# Patient Record
Sex: FEMALE | Race: BLACK OR AFRICAN AMERICAN | NOT HISPANIC OR LATINO | URBAN - METROPOLITAN AREA
[De-identification: names, ages, dates, MRNs, and addresses within clinical notes are randomized per-mention and may not be internally consistent; named-entity substitution may affect disease eponyms.]

---

## 2017-11-29 ENCOUNTER — EMERGENCY (EMERGENCY)
Facility: HOSPITAL | Age: 37
LOS: 0 days | Discharge: HOME | End: 2017-11-29
Attending: EMERGENCY MEDICINE
Payer: COMMERCIAL

## 2017-11-29 VITALS
TEMPERATURE: 98 F | RESPIRATION RATE: 16 BRPM | HEIGHT: 65 IN | DIASTOLIC BLOOD PRESSURE: 114 MMHG | SYSTOLIC BLOOD PRESSURE: 163 MMHG | OXYGEN SATURATION: 100 % | WEIGHT: 229.94 LBS | HEART RATE: 83 BPM

## 2017-11-29 DIAGNOSIS — E11.9 TYPE 2 DIABETES MELLITUS WITHOUT COMPLICATIONS: ICD-10-CM
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
## 2017-11-29 DIAGNOSIS — I10 ESSENTIAL (PRIMARY) HYPERTENSION: ICD-10-CM

## 2017-11-29 DIAGNOSIS — S69.91XA UNSPECIFIED INJURY OF RIGHT WRIST, HAND AND FINGER(S), INITIAL ENCOUNTER: ICD-10-CM
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
## 2017-11-29 DIAGNOSIS — M25.531 PAIN IN RIGHT WRIST: ICD-10-CM

## 2017-11-29 PROCEDURE — 99283 EMERGENCY DEPT VISIT LOW MDM: CPT

## 2017-11-29 PROCEDURE — 73110 X-RAY EXAM OF WRIST: CPT | Mod: 26,RT

## 2017-11-29 PROCEDURE — 73130 X-RAY EXAM OF HAND: CPT | Mod: 26,RT

## 2017-11-29 RX ORDER — ACETAMINOPHEN 500 MG
650 TABLET ORAL ONCE
Qty: 0 | Refills: 0 | Status: COMPLETED | OUTPATIENT
Start: 2017-11-29 | End: 2017-11-29

## 2017-11-29 RX ADMIN — Medication 650 MILLIGRAM(S): at 12:22

## 2017-11-29 NOTE — ED PROVIDER NOTE - MUSCULOSKELETAL, MLM
mild swelling right distal wrist with some point tenderness radial side. full rom but with pain. no scaphoid/snuff box tenderness/axial load pain.

## 2017-11-29 NOTE — ED PROVIDER NOTE - OBJECTIVE STATEMENT
36 y/o female hx htn, dm c/o pain to right wrist s/p mechanical fall just PTA. Denies hitting anything else, just fell on hand/wrist and with pain to distal wrist. no hitting head/loc/anticoagulation. Denies cp, sob, abd pain, other extremity pain. No other issues.     ROS: No fever/chills. No eye pain/changes in vision, No ear pain/sore throat/dysphagia, No chest pain/palpitations. No SOB/cough/. No abdominal pain, N/V/D, no black/bloody bm. No dysuria/frequency/discharge, No headache. No Dizziness.    No rashes or breaks in skin. No numbness/tingling/weakness.

## 2017-11-29 NOTE — ED ADULT NURSE NOTE - OBJECTIVE STATEMENT
38 yo female c/o s/p mechanical fall x 1000 today, R wrist pain, denies loc or head injury or trauma, pt ambulates without difficulty.

## 2019-03-03 ENCOUNTER — EMERGENCY (EMERGENCY)
Age: 39
LOS: 1 days | Discharge: HOME | End: 2019-03-03
Attending: EMERGENCY MEDICINE | Admitting: EMERGENCY MEDICINE
Payer: COMMERCIAL

## 2019-03-03 VITALS — OXYGEN SATURATION: 100 % | HEART RATE: 68 BPM | TEMPERATURE: 99 F | RESPIRATION RATE: 14 BRPM

## 2019-03-03 VITALS — SYSTOLIC BLOOD PRESSURE: 139 MMHG | DIASTOLIC BLOOD PRESSURE: 96 MMHG

## 2019-03-03 LAB
ALBUMIN SERPL ELPH-MCNC: 4 G/DL — SIGNIFICANT CHANGE UP (ref 3.3–5)
ALP SERPL-CCNC: 74 U/L — SIGNIFICANT CHANGE UP (ref 40–120)
ALT FLD-CCNC: 12 U/L — SIGNIFICANT CHANGE UP (ref 4–33)
ANION GAP SERPL CALC-SCNC: 11 MMO/L — SIGNIFICANT CHANGE UP (ref 7–14)
AST SERPL-CCNC: 15 U/L — SIGNIFICANT CHANGE UP (ref 4–32)
BASE EXCESS BLDV CALC-SCNC: 2.4 MMOL/L — SIGNIFICANT CHANGE UP
BASOPHILS # BLD AUTO: 0.04 K/UL — SIGNIFICANT CHANGE UP (ref 0–0.2)
BASOPHILS NFR BLD AUTO: 0.4 % — SIGNIFICANT CHANGE UP (ref 0–2)
BILIRUB SERPL-MCNC: 0.2 MG/DL — SIGNIFICANT CHANGE UP (ref 0.2–1.2)
BLOOD GAS VENOUS - CREATININE: 0.65 MG/DL — SIGNIFICANT CHANGE UP (ref 0.5–1.3)
BUN SERPL-MCNC: 20 MG/DL — SIGNIFICANT CHANGE UP (ref 7–23)
CALCIUM SERPL-MCNC: 9 MG/DL — SIGNIFICANT CHANGE UP (ref 8.4–10.5)
CHLORIDE BLDV-SCNC: 105 MMOL/L — SIGNIFICANT CHANGE UP (ref 96–108)
CHLORIDE SERPL-SCNC: 100 MMOL/L — SIGNIFICANT CHANGE UP (ref 98–107)
CO2 SERPL-SCNC: 26 MMOL/L — SIGNIFICANT CHANGE UP (ref 22–31)
COHGB MFR BLDV: 4.6 % — HIGH (ref 0.5–1.5)
COHGB MFR BLDV: 9.9 G/DL — LOW (ref 11.5–15.5)
CREAT SERPL-MCNC: 0.75 MG/DL — SIGNIFICANT CHANGE UP (ref 0.5–1.3)
EOSINOPHIL # BLD AUTO: 0.26 K/UL — SIGNIFICANT CHANGE UP (ref 0–0.5)
EOSINOPHIL NFR BLD AUTO: 2.7 % — SIGNIFICANT CHANGE UP (ref 0–6)
GAS PNL BLDV: 134 MMOL/L — LOW (ref 136–146)
GLUCOSE BLDV-MCNC: 133 — HIGH (ref 70–99)
GLUCOSE SERPL-MCNC: 139 MG/DL — HIGH (ref 70–99)
HCG SERPL-ACNC: < 5 MIU/ML — SIGNIFICANT CHANGE UP
HCO3 BLDV-SCNC: 26 MMOL/L — SIGNIFICANT CHANGE UP (ref 20–27)
HCT VFR BLD CALC: 32.9 % — LOW (ref 34.5–45)
HCT VFR BLDV CALC: 30.6 % — LOW (ref 34.5–45)
HGB BLD-MCNC: 9.7 G/DL — LOW (ref 11.5–15.5)
HGB BLDV-MCNC: 9.9 G/DL — LOW (ref 11.5–15.5)
IMM GRANULOCYTES NFR BLD AUTO: 0.4 % — SIGNIFICANT CHANGE UP (ref 0–1.5)
LACTATE BLDV-MCNC: 1.3 MMOL/L — SIGNIFICANT CHANGE UP (ref 0.5–2)
LYMPHOCYTES # BLD AUTO: 1.8 K/UL — SIGNIFICANT CHANGE UP (ref 1–3.3)
LYMPHOCYTES # BLD AUTO: 18.6 % — SIGNIFICANT CHANGE UP (ref 13–44)
MCHC RBC-ENTMCNC: 25.3 PG — LOW (ref 27–34)
MCHC RBC-ENTMCNC: 29.5 % — LOW (ref 32–36)
MCV RBC AUTO: 85.9 FL — SIGNIFICANT CHANGE UP (ref 80–100)
METHGB MFR BLDV: 1 % — SIGNIFICANT CHANGE UP (ref 0–1.5)
MONOCYTES # BLD AUTO: 0.53 K/UL — SIGNIFICANT CHANGE UP (ref 0–0.9)
MONOCYTES NFR BLD AUTO: 5.5 % — SIGNIFICANT CHANGE UP (ref 2–14)
NEUTROPHILS # BLD AUTO: 7.02 K/UL — SIGNIFICANT CHANGE UP (ref 1.8–7.4)
NEUTROPHILS NFR BLD AUTO: 72.4 % — SIGNIFICANT CHANGE UP (ref 43–77)
NRBC # FLD: 0 K/UL — LOW (ref 25–125)
OXYHGB MFR BLDV: 66.3 % — SIGNIFICANT CHANGE UP (ref 59–84)
PCO2 BLDV: 47 MMHG — SIGNIFICANT CHANGE UP (ref 41–51)
PH BLDV: 7.38 PH — SIGNIFICANT CHANGE UP (ref 7.32–7.43)
PLATELET # BLD AUTO: 300 K/UL — SIGNIFICANT CHANGE UP (ref 150–400)
PMV BLD: 11.7 FL — SIGNIFICANT CHANGE UP (ref 7–13)
PO2 BLDV: 39 MMHG — SIGNIFICANT CHANGE UP (ref 35–40)
POTASSIUM BLDV-SCNC: 3.2 MMOL/L — LOW (ref 3.4–4.5)
POTASSIUM SERPL-MCNC: 3.3 MMOL/L — LOW (ref 3.5–5.3)
POTASSIUM SERPL-SCNC: 3.3 MMOL/L — LOW (ref 3.5–5.3)
PROT SERPL-MCNC: 6.9 G/DL — SIGNIFICANT CHANGE UP (ref 6–8.3)
RBC # BLD: 3.83 M/UL — SIGNIFICANT CHANGE UP (ref 3.8–5.2)
RBC # FLD: 15.8 % — HIGH (ref 10.3–14.5)
SAO2 % BLDV: 70.2 % — SIGNIFICANT CHANGE UP (ref 60–85)
SODIUM SERPL-SCNC: 137 MMOL/L — SIGNIFICANT CHANGE UP (ref 135–145)
WBC # BLD: 9.69 K/UL — SIGNIFICANT CHANGE UP (ref 3.8–10.5)
WBC # FLD AUTO: 9.69 K/UL — SIGNIFICANT CHANGE UP (ref 3.8–10.5)

## 2019-03-03 PROCEDURE — 99284 EMERGENCY DEPT VISIT MOD MDM: CPT | Mod: 25

## 2019-03-03 PROCEDURE — 71045 X-RAY EXAM CHEST 1 VIEW: CPT | Mod: 26

## 2019-03-03 NOTE — ED PROVIDER NOTE - ENMT, MLM
Airway patent, Nasal mucosa clear. Mouth with normal mucosa. No soot or carbonaceous material to nose/throat

## 2019-03-03 NOTE — ED PEDIATRIC NURSE REASSESSMENT NOTE - NS ED NURSE REASSESS COMMENT FT2
Dried blood cleaned from pt lower extremities. Pt denies pain. No deep wounds noted. Small skin tear noted to right knee. Dried cut to right fourth digit. no active bleeding.

## 2019-03-03 NOTE — ED PROVIDER NOTE - ATTENDING CONTRIBUTION TO CARE
Brit Trammell MD - Attending Physician: I have personally seen and examined this patient with the resident/fellow.  I have fully participated in the care of this patient. I have reviewed all pertinent clinical information, including history, physical exam, plan and the Resident/Fellow’s note and agree except as noted. See MDM
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
## 2019-03-03 NOTE — ED PROVIDER NOTE - PROGRESS NOTE DETAILS
Carboxyhemoglobin < 5. S/p 100% NRB obs period without complaints. No SOB/CP, lungs clear. Will dc. F/u outpatient. Return precautions discussed
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
## 2019-07-06 NOTE — ED ADULT NURSE NOTE - CAS DISCH ACCOMP BY
As instructed please call your surgeon on Monday return if you worsening or other concerning symptoms please follow up for left adnexal/ovarian cyst which was suggested on CT scan as instructed as discussed Self

## 2021-04-01 ENCOUNTER — INPATIENT (INPATIENT)
Facility: HOSPITAL | Age: 41
LOS: 0 days | Discharge: HOME | DRG: 177 | End: 2021-04-01
Attending: HOSPITALIST | Admitting: HOSPITALIST
Payer: MEDICAID

## 2021-04-01 ENCOUNTER — TRANSCRIPTION ENCOUNTER (OUTPATIENT)
Age: 41
End: 2021-04-01

## 2021-04-01 ENCOUNTER — INPATIENT (INPATIENT)
Facility: HOSPITAL | Age: 41
LOS: 3 days | Discharge: HOME | DRG: 177 | End: 2021-04-05
Attending: FAMILY MEDICINE | Admitting: FAMILY MEDICINE
Payer: MEDICAID

## 2021-04-01 VITALS
RESPIRATION RATE: 20 BRPM | HEART RATE: 100 BPM | DIASTOLIC BLOOD PRESSURE: 89 MMHG | WEIGHT: 289.91 LBS | OXYGEN SATURATION: 93 % | HEIGHT: 65 IN | SYSTOLIC BLOOD PRESSURE: 136 MMHG | TEMPERATURE: 101 F

## 2021-04-01 VITALS
SYSTOLIC BLOOD PRESSURE: 102 MMHG | RESPIRATION RATE: 18 BRPM | TEMPERATURE: 98 F | HEART RATE: 86 BPM | DIASTOLIC BLOOD PRESSURE: 69 MMHG | OXYGEN SATURATION: 95 %

## 2021-04-01 VITALS
HEART RATE: 81 BPM | RESPIRATION RATE: 18 BRPM | SYSTOLIC BLOOD PRESSURE: 117 MMHG | DIASTOLIC BLOOD PRESSURE: 56 MMHG | TEMPERATURE: 98 F | OXYGEN SATURATION: 95 %

## 2021-04-01 DIAGNOSIS — E11.9 TYPE 2 DIABETES MELLITUS WITHOUT COMPLICATIONS: ICD-10-CM

## 2021-04-01 DIAGNOSIS — U07.1 COVID-19: ICD-10-CM

## 2021-04-01 DIAGNOSIS — Z98.891 HISTORY OF UTERINE SCAR FROM PREVIOUS SURGERY: Chronic | ICD-10-CM

## 2021-04-01 DIAGNOSIS — I10 ESSENTIAL (PRIMARY) HYPERTENSION: ICD-10-CM

## 2021-04-01 DIAGNOSIS — R74.01 ELEVATION OF LEVELS OF LIVER TRANSAMINASE LEVELS: ICD-10-CM

## 2021-04-01 DIAGNOSIS — Z29.9 ENCOUNTER FOR PROPHYLACTIC MEASURES, UNSPECIFIED: ICD-10-CM

## 2021-04-01 LAB
A1C WITH ESTIMATED AVERAGE GLUCOSE RESULT: 6.5 % — HIGH (ref 4–5.6)
ALBUMIN SERPL ELPH-MCNC: 2.6 G/DL — LOW (ref 3.3–5)
ALBUMIN SERPL ELPH-MCNC: 3.7 G/DL — SIGNIFICANT CHANGE UP (ref 3.3–5)
ALP SERPL-CCNC: 78 U/L — SIGNIFICANT CHANGE UP (ref 40–120)
ALP SERPL-CCNC: 83 U/L — SIGNIFICANT CHANGE UP (ref 40–120)
ALT FLD-CCNC: 73 U/L — HIGH (ref 10–45)
ALT FLD-CCNC: 98 U/L — HIGH (ref 12–78)
ANION GAP SERPL CALC-SCNC: 12 MMOL/L — SIGNIFICANT CHANGE UP (ref 5–17)
AST SERPL-CCNC: 85 U/L — HIGH (ref 15–37)
AST SERPL-CCNC: 94 U/L — HIGH (ref 10–40)
BASOPHILS # BLD AUTO: 0.01 K/UL — SIGNIFICANT CHANGE UP (ref 0–0.2)
BASOPHILS NFR BLD AUTO: 0.3 % — SIGNIFICANT CHANGE UP (ref 0–2)
BILIRUB DIRECT SERPL-MCNC: 0.1 MG/DL — SIGNIFICANT CHANGE UP (ref 0.05–0.2)
BILIRUB INDIRECT FLD-MCNC: 0.2 MG/DL — SIGNIFICANT CHANGE UP (ref 0.2–1)
BILIRUB SERPL-MCNC: 0.3 MG/DL — SIGNIFICANT CHANGE UP (ref 0.2–1.2)
BILIRUB SERPL-MCNC: 0.3 MG/DL — SIGNIFICANT CHANGE UP (ref 0.2–1.2)
BUN SERPL-MCNC: 10 MG/DL — SIGNIFICANT CHANGE UP (ref 7–23)
CALCIUM SERPL-MCNC: 8.6 MG/DL — SIGNIFICANT CHANGE UP (ref 8.4–10.5)
CHLORIDE SERPL-SCNC: 98 MMOL/L — SIGNIFICANT CHANGE UP (ref 96–108)
CO2 SERPL-SCNC: 23 MMOL/L — SIGNIFICANT CHANGE UP (ref 22–31)
CREAT SERPL-MCNC: 0.77 MG/DL — SIGNIFICANT CHANGE UP (ref 0.5–1.3)
CREAT SERPL-MCNC: 0.91 MG/DL — SIGNIFICANT CHANGE UP (ref 0.5–1.3)
CRP SERPL-MCNC: 81 MG/L — HIGH (ref 0–4)
D DIMER BLD IA.RAPID-MCNC: 499 NG/ML DDU — HIGH
EOSINOPHIL # BLD AUTO: 0.01 K/UL — SIGNIFICANT CHANGE UP (ref 0–0.5)
EOSINOPHIL NFR BLD AUTO: 0.3 % — SIGNIFICANT CHANGE UP (ref 0–6)
ESTIMATED AVERAGE GLUCOSE: 140 MG/DL — HIGH (ref 68–114)
FERRITIN SERPL-MCNC: 236 NG/ML — HIGH (ref 15–150)
GAS PNL BLDV: SIGNIFICANT CHANGE UP
GLUCOSE BLDC GLUCOMTR-MCNC: 136 MG/DL — HIGH (ref 70–99)
GLUCOSE BLDC GLUCOMTR-MCNC: 147 MG/DL — HIGH (ref 70–99)
GLUCOSE SERPL-MCNC: 105 MG/DL — HIGH (ref 70–99)
HCT VFR BLD CALC: 33.4 % — LOW (ref 34.5–45)
HGB BLD-MCNC: 10.1 G/DL — LOW (ref 11.5–15.5)
IMM GRANULOCYTES NFR BLD AUTO: 0.5 % — SIGNIFICANT CHANGE UP (ref 0–1.5)
INR BLD: 1.13 RATIO — SIGNIFICANT CHANGE UP (ref 0.88–1.16)
LYMPHOCYTES # BLD AUTO: 0.9 K/UL — LOW (ref 1–3.3)
LYMPHOCYTES # BLD AUTO: 22.8 % — SIGNIFICANT CHANGE UP (ref 13–44)
MAGNESIUM SERPL-MCNC: 2.1 MG/DL — SIGNIFICANT CHANGE UP (ref 1.6–2.6)
MCHC RBC-ENTMCNC: 24.5 PG — LOW (ref 27–34)
MCHC RBC-ENTMCNC: 30.2 GM/DL — LOW (ref 32–36)
MCV RBC AUTO: 80.9 FL — SIGNIFICANT CHANGE UP (ref 80–100)
MONOCYTES # BLD AUTO: 0.13 K/UL — SIGNIFICANT CHANGE UP (ref 0–0.9)
MONOCYTES NFR BLD AUTO: 3.3 % — SIGNIFICANT CHANGE UP (ref 2–14)
NEUTROPHILS # BLD AUTO: 2.87 K/UL — SIGNIFICANT CHANGE UP (ref 1.8–7.4)
NEUTROPHILS NFR BLD AUTO: 72.8 % — SIGNIFICANT CHANGE UP (ref 43–77)
NRBC # BLD: 0 /100 WBCS — SIGNIFICANT CHANGE UP (ref 0–0)
PHOSPHATE SERPL-MCNC: 3.1 MG/DL — SIGNIFICANT CHANGE UP (ref 2.5–4.5)
PLATELET # BLD AUTO: 259 K/UL — SIGNIFICANT CHANGE UP (ref 150–400)
POTASSIUM SERPL-MCNC: 3.7 MMOL/L — SIGNIFICANT CHANGE UP (ref 3.5–5.3)
POTASSIUM SERPL-SCNC: 3.7 MMOL/L — SIGNIFICANT CHANGE UP (ref 3.5–5.3)
PROCALCITONIN SERPL-MCNC: 0.09 NG/ML — SIGNIFICANT CHANGE UP (ref 0.02–0.1)
PROT SERPL-MCNC: 7.1 G/DL — SIGNIFICANT CHANGE UP (ref 6–8.3)
PROT SERPL-MCNC: 7.2 G/DL — SIGNIFICANT CHANGE UP (ref 6–8.3)
PROTHROM AB SERPL-ACNC: 13.1 SEC — SIGNIFICANT CHANGE UP (ref 10.6–13.6)
RAPID RVP RESULT: DETECTED
RBC # BLD: 4.13 M/UL — SIGNIFICANT CHANGE UP (ref 3.8–5.2)
RBC # FLD: 16 % — HIGH (ref 10.3–14.5)
SARS-COV-2 RNA SPEC QL NAA+PROBE: DETECTED
SODIUM SERPL-SCNC: 133 MMOL/L — LOW (ref 135–145)
WBC # BLD: 3.94 K/UL — SIGNIFICANT CHANGE UP (ref 3.8–10.5)
WBC # FLD AUTO: 3.94 K/UL — SIGNIFICANT CHANGE UP (ref 3.8–10.5)

## 2021-04-01 PROCEDURE — 86140 C-REACTIVE PROTEIN: CPT

## 2021-04-01 PROCEDURE — 84295 ASSAY OF SERUM SODIUM: CPT

## 2021-04-01 PROCEDURE — 82962 GLUCOSE BLOOD TEST: CPT

## 2021-04-01 PROCEDURE — 82947 ASSAY GLUCOSE BLOOD QUANT: CPT

## 2021-04-01 PROCEDURE — 83605 ASSAY OF LACTIC ACID: CPT

## 2021-04-01 PROCEDURE — 80053 COMPREHEN METABOLIC PANEL: CPT

## 2021-04-01 PROCEDURE — 82803 BLOOD GASES ANY COMBINATION: CPT

## 2021-04-01 PROCEDURE — 84132 ASSAY OF SERUM POTASSIUM: CPT

## 2021-04-01 PROCEDURE — 99223 1ST HOSP IP/OBS HIGH 75: CPT

## 2021-04-01 PROCEDURE — 82728 ASSAY OF FERRITIN: CPT

## 2021-04-01 PROCEDURE — 84100 ASSAY OF PHOSPHORUS: CPT

## 2021-04-01 PROCEDURE — 71045 X-RAY EXAM CHEST 1 VIEW: CPT

## 2021-04-01 PROCEDURE — 83735 ASSAY OF MAGNESIUM: CPT

## 2021-04-01 PROCEDURE — 82435 ASSAY OF BLOOD CHLORIDE: CPT

## 2021-04-01 PROCEDURE — 83036 HEMOGLOBIN GLYCOSYLATED A1C: CPT

## 2021-04-01 PROCEDURE — 99285 EMERGENCY DEPT VISIT HI MDM: CPT

## 2021-04-01 PROCEDURE — 85379 FIBRIN DEGRADATION QUANT: CPT

## 2021-04-01 PROCEDURE — 84145 PROCALCITONIN (PCT): CPT

## 2021-04-01 PROCEDURE — 85025 COMPLETE CBC W/AUTO DIFF WBC: CPT

## 2021-04-01 PROCEDURE — 85014 HEMATOCRIT: CPT

## 2021-04-01 PROCEDURE — 85018 HEMOGLOBIN: CPT

## 2021-04-01 PROCEDURE — 99285 EMERGENCY DEPT VISIT HI MDM: CPT | Mod: 25

## 2021-04-01 PROCEDURE — 82330 ASSAY OF CALCIUM: CPT

## 2021-04-01 PROCEDURE — 0225U NFCT DS DNA&RNA 21 SARSCOV2: CPT

## 2021-04-01 PROCEDURE — 71045 X-RAY EXAM CHEST 1 VIEW: CPT | Mod: 26

## 2021-04-01 RX ORDER — DEXAMETHASONE 0.5 MG/5ML
6 ELIXIR ORAL DAILY
Refills: 0 | Status: DISCONTINUED | OUTPATIENT
Start: 2021-04-01 | End: 2021-04-01

## 2021-04-01 RX ORDER — ENOXAPARIN SODIUM 100 MG/ML
40 INJECTION SUBCUTANEOUS
Qty: 0 | Refills: 0 | DISCHARGE
Start: 2021-04-01

## 2021-04-01 RX ORDER — NIFEDIPINE 30 MG
30 TABLET, EXTENDED RELEASE 24 HR ORAL DAILY
Refills: 0 | Status: DISCONTINUED | OUTPATIENT
Start: 2021-04-01 | End: 2021-04-05

## 2021-04-01 RX ORDER — SODIUM CHLORIDE 9 MG/ML
1000 INJECTION INTRAMUSCULAR; INTRAVENOUS; SUBCUTANEOUS
Refills: 0 | Status: DISCONTINUED | OUTPATIENT
Start: 2021-04-01 | End: 2021-04-01

## 2021-04-01 RX ORDER — DEXTROSE 50 % IN WATER 50 %
15 SYRINGE (ML) INTRAVENOUS ONCE
Refills: 0 | Status: DISCONTINUED | OUTPATIENT
Start: 2021-04-01 | End: 2021-04-05

## 2021-04-01 RX ORDER — REMDESIVIR 5 MG/ML
100 INJECTION INTRAVENOUS EVERY 24 HOURS
Refills: 0 | Status: DISCONTINUED | OUTPATIENT
Start: 2021-04-02 | End: 2021-04-02

## 2021-04-01 RX ORDER — METFORMIN HYDROCHLORIDE 850 MG/1
1 TABLET ORAL
Qty: 0 | Refills: 0 | DISCHARGE

## 2021-04-01 RX ORDER — ENOXAPARIN SODIUM 100 MG/ML
40 INJECTION SUBCUTANEOUS DAILY
Refills: 0 | Status: DISCONTINUED | OUTPATIENT
Start: 2021-04-01 | End: 2021-04-05

## 2021-04-01 RX ORDER — DEXTROSE 50 % IN WATER 50 %
25 SYRINGE (ML) INTRAVENOUS ONCE
Refills: 0 | Status: DISCONTINUED | OUTPATIENT
Start: 2021-04-01 | End: 2021-04-01

## 2021-04-01 RX ORDER — DEXTROSE 50 % IN WATER 50 %
25 SYRINGE (ML) INTRAVENOUS ONCE
Refills: 0 | Status: DISCONTINUED | OUTPATIENT
Start: 2021-04-01 | End: 2021-04-05

## 2021-04-01 RX ORDER — SODIUM CHLORIDE 9 MG/ML
1000 INJECTION, SOLUTION INTRAVENOUS
Refills: 0 | Status: DISCONTINUED | OUTPATIENT
Start: 2021-04-01 | End: 2021-04-05

## 2021-04-01 RX ORDER — DEXTROSE 50 % IN WATER 50 %
12.5 SYRINGE (ML) INTRAVENOUS ONCE
Refills: 0 | Status: DISCONTINUED | OUTPATIENT
Start: 2021-04-01 | End: 2021-04-01

## 2021-04-01 RX ORDER — ACETAMINOPHEN 500 MG
650 TABLET ORAL EVERY 4 HOURS
Refills: 0 | Status: DISCONTINUED | OUTPATIENT
Start: 2021-04-01 | End: 2021-04-01

## 2021-04-01 RX ORDER — SODIUM CHLORIDE 9 MG/ML
1000 INJECTION, SOLUTION INTRAVENOUS
Refills: 0 | Status: DISCONTINUED | OUTPATIENT
Start: 2021-04-01 | End: 2021-04-01

## 2021-04-01 RX ORDER — LISINOPRIL 2.5 MG/1
20 TABLET ORAL DAILY
Refills: 0 | Status: DISCONTINUED | OUTPATIENT
Start: 2021-04-01 | End: 2021-04-05

## 2021-04-01 RX ORDER — DEXTROSE 50 % IN WATER 50 %
12.5 SYRINGE (ML) INTRAVENOUS ONCE
Refills: 0 | Status: DISCONTINUED | OUTPATIENT
Start: 2021-04-01 | End: 2021-04-05

## 2021-04-01 RX ORDER — INSULIN LISPRO 100/ML
VIAL (ML) SUBCUTANEOUS
Refills: 0 | Status: DISCONTINUED | OUTPATIENT
Start: 2021-04-01 | End: 2021-04-05

## 2021-04-01 RX ORDER — ACETAMINOPHEN 500 MG
650 TABLET ORAL EVERY 6 HOURS
Refills: 0 | Status: DISCONTINUED | OUTPATIENT
Start: 2021-04-01 | End: 2021-04-05

## 2021-04-01 RX ORDER — INFLUENZA VIRUS VACCINE 15; 15; 15; 15 UG/.5ML; UG/.5ML; UG/.5ML; UG/.5ML
0.5 SUSPENSION INTRAMUSCULAR ONCE
Refills: 0 | Status: DISCONTINUED | OUTPATIENT
Start: 2021-04-01 | End: 2021-04-05

## 2021-04-01 RX ORDER — DEXAMETHASONE 0.5 MG/5ML
1 ELIXIR ORAL
Qty: 0 | Refills: 0 | DISCHARGE
Start: 2021-04-01

## 2021-04-01 RX ORDER — REMDESIVIR 5 MG/ML
100 INJECTION INTRAVENOUS EVERY 24 HOURS
Refills: 0 | Status: DISCONTINUED | OUTPATIENT
Start: 2021-04-02 | End: 2021-04-01

## 2021-04-01 RX ORDER — INSULIN LISPRO 100/ML
VIAL (ML) SUBCUTANEOUS
Refills: 0 | Status: DISCONTINUED | OUTPATIENT
Start: 2021-04-01 | End: 2021-04-01

## 2021-04-01 RX ORDER — LISINOPRIL 2.5 MG/1
20 TABLET ORAL DAILY
Refills: 0 | Status: DISCONTINUED | OUTPATIENT
Start: 2021-04-01 | End: 2021-04-01

## 2021-04-01 RX ORDER — DEXAMETHASONE 0.5 MG/5ML
6 ELIXIR ORAL ONCE
Refills: 0 | Status: COMPLETED | OUTPATIENT
Start: 2021-04-01 | End: 2021-04-01

## 2021-04-01 RX ORDER — REMDESIVIR 5 MG/ML
100 INJECTION INTRAVENOUS
Qty: 0 | Refills: 0 | DISCHARGE
Start: 2021-04-01

## 2021-04-01 RX ORDER — DEXTROSE 50 % IN WATER 50 %
15 SYRINGE (ML) INTRAVENOUS ONCE
Refills: 0 | Status: DISCONTINUED | OUTPATIENT
Start: 2021-04-01 | End: 2021-04-01

## 2021-04-01 RX ORDER — GLUCAGON INJECTION, SOLUTION 0.5 MG/.1ML
1 INJECTION, SOLUTION SUBCUTANEOUS ONCE
Refills: 0 | Status: DISCONTINUED | OUTPATIENT
Start: 2021-04-01 | End: 2021-04-05

## 2021-04-01 RX ORDER — DEXAMETHASONE 0.5 MG/5ML
6 ELIXIR ORAL DAILY
Refills: 0 | Status: DISCONTINUED | OUTPATIENT
Start: 2021-04-02 | End: 2021-04-05

## 2021-04-01 RX ORDER — ACETAMINOPHEN 500 MG
2 TABLET ORAL
Qty: 0 | Refills: 0 | DISCHARGE
Start: 2021-04-01

## 2021-04-01 RX ORDER — REMDESIVIR 5 MG/ML
200 INJECTION INTRAVENOUS EVERY 24 HOURS
Refills: 0 | Status: COMPLETED | OUTPATIENT
Start: 2021-04-01 | End: 2021-04-01

## 2021-04-01 RX ORDER — INSULIN LISPRO 100/ML
VIAL (ML) SUBCUTANEOUS AT BEDTIME
Refills: 0 | Status: DISCONTINUED | OUTPATIENT
Start: 2021-04-01 | End: 2021-04-01

## 2021-04-01 RX ORDER — SODIUM CHLORIDE 9 MG/ML
1000 INJECTION INTRAMUSCULAR; INTRAVENOUS; SUBCUTANEOUS ONCE
Refills: 0 | Status: COMPLETED | OUTPATIENT
Start: 2021-04-01 | End: 2021-04-01

## 2021-04-01 RX ORDER — GLUCAGON INJECTION, SOLUTION 0.5 MG/.1ML
1 INJECTION, SOLUTION SUBCUTANEOUS ONCE
Refills: 0 | Status: DISCONTINUED | OUTPATIENT
Start: 2021-04-01 | End: 2021-04-01

## 2021-04-01 RX ORDER — FERROUS SULFATE 325(65) MG
325 TABLET ORAL DAILY
Refills: 0 | Status: DISCONTINUED | OUTPATIENT
Start: 2021-04-01 | End: 2021-04-05

## 2021-04-01 RX ORDER — DEXAMETHASONE 0.5 MG/5ML
6 ELIXIR ORAL DAILY
Refills: 0 | Status: DISCONTINUED | OUTPATIENT
Start: 2021-04-02 | End: 2021-04-01

## 2021-04-01 RX ORDER — NIFEDIPINE 30 MG
30 TABLET, EXTENDED RELEASE 24 HR ORAL DAILY
Refills: 0 | Status: DISCONTINUED | OUTPATIENT
Start: 2021-04-01 | End: 2021-04-01

## 2021-04-01 RX ORDER — ENOXAPARIN SODIUM 100 MG/ML
40 INJECTION SUBCUTANEOUS EVERY 12 HOURS
Refills: 0 | Status: DISCONTINUED | OUTPATIENT
Start: 2021-04-01 | End: 2021-04-01

## 2021-04-01 RX ORDER — FERROUS SULFATE 325(65) MG
325 TABLET ORAL DAILY
Refills: 0 | Status: DISCONTINUED | OUTPATIENT
Start: 2021-04-01 | End: 2021-04-01

## 2021-04-01 RX ORDER — ACETAMINOPHEN 500 MG
650 TABLET ORAL ONCE
Refills: 0 | Status: COMPLETED | OUTPATIENT
Start: 2021-04-01 | End: 2021-04-01

## 2021-04-01 RX ORDER — REMDESIVIR 5 MG/ML
INJECTION INTRAVENOUS
Refills: 0 | Status: DISCONTINUED | OUTPATIENT
Start: 2021-04-01 | End: 2021-04-01

## 2021-04-01 RX ADMIN — Medication 650 MILLIGRAM(S): at 20:07

## 2021-04-01 RX ADMIN — Medication 325 MILLIGRAM(S): at 12:44

## 2021-04-01 RX ADMIN — REMDESIVIR 200 MILLIGRAM(S): 5 INJECTION INTRAVENOUS at 10:32

## 2021-04-01 RX ADMIN — Medication 650 MILLIGRAM(S): at 21:06

## 2021-04-01 RX ADMIN — Medication 600 MILLIGRAM(S): at 21:23

## 2021-04-01 RX ADMIN — Medication 650 MILLIGRAM(S): at 12:44

## 2021-04-01 RX ADMIN — LISINOPRIL 20 MILLIGRAM(S): 2.5 TABLET ORAL at 10:32

## 2021-04-01 RX ADMIN — SODIUM CHLORIDE 1000 MILLILITER(S): 9 INJECTION INTRAMUSCULAR; INTRAVENOUS; SUBCUTANEOUS at 02:05

## 2021-04-01 RX ADMIN — Medication 650 MILLIGRAM(S): at 02:05

## 2021-04-01 RX ADMIN — Medication 6 MILLIGRAM(S): at 02:05

## 2021-04-01 RX ADMIN — Medication 325 MILLIGRAM(S): at 21:24

## 2021-04-01 RX ADMIN — ENOXAPARIN SODIUM 40 MILLIGRAM(S): 100 INJECTION SUBCUTANEOUS at 21:24

## 2021-04-01 RX ADMIN — Medication 650 MILLIGRAM(S): at 13:30

## 2021-04-01 NOTE — DISCHARGE NOTE PROVIDER - NSDCMRMEDTOKEN_GEN_ALL_CORE_FT
acetaminophen 325 mg oral tablet: 2 tab(s) orally every 4 hours, As needed, Temp greater or equal to 38.5C (101.3F)  dexamethasone 6 mg oral tablet: 1 tab(s) orally once a day  enoxaparin: 40 milligram(s) subcutaneous 2 times a day  ferrous sulfate 325 mg (65 mg elemental iron) oral tablet: 1 tab(s) orally once a day  lisinopril 20 mg oral tablet: 1 tab(s) orally once a day  Procardia XL 30 mg oral tablet, extended release: 1 tab(s) orally once a day  remdesivir 5 mg/mL intravenous solution: 100 milligram(s) intravenous once a day x 4 days

## 2021-04-01 NOTE — H&P ADULT - PROBLEM SELECTOR PLAN 3
monitor BP  pharmacy clarfied medications   patient on procardia and lisinopril - continued with hold parameters check a1c  hold home metformin  monitor FS  ISS for coverage for now   may need to add long acting insulin/premeal if FS elevated on decadron

## 2021-04-01 NOTE — ED ADULT TRIAGE NOTE - CHIEF COMPLAINT QUOTE
tested positive for COVID x 7 days; CXR today at [facility] revealed bilateral pneumonia, per pt; c/o sob

## 2021-04-01 NOTE — ED ADULT NURSE REASSESSMENT NOTE - NS ED NURSE REASSESS COMMENT FT1
Pt A+Ox3, VSS, resting comfortably in stretcher, call bell in reach. Pt aware of plan of care, admitted to medicine for COVID and diarrhea, pending admission bed.

## 2021-04-01 NOTE — DISCHARGE NOTE PROVIDER - NSDCCAREPROVSEEN_GEN_ALL_CORE_FT
CHIEF COMPLAINT:   Chief Complaint   Patient presents with   • Surgical Followup     left foot, still having some discomfort, still limping         HISTORY OF PRESENT ILLNESS:  Manuelito Boles presents to clinic today 4 week status post left foot removal of painful hardware and chilectomy. Patient relates minimal pain to the surgical foot. Has been ambulating with gym shoe. Relates pain to the achilles tendon. No other complaints.    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 EGD   • Fibromyalgia    • Foot deformity    • Genital warts    • Hernia, diaphragmatic    • Lactose intolerance    • Lumbar radiculopathy    • Neuropathy    • No blood products     PATIENT REQUESTS NO BLOOD PRODUCTS   • PTSD (post-traumatic stress disorder)    • RAD (reactive airway disease)    • RBBB    • Sjoegren syndrome (CMS/HCC)    • Unspecified hypothyroidism    • Vitamin B 12 deficiency          PAST SURGICAL HISTORY:   Past Surgical History:   Procedure Laterality Date   • Bunionectomy Left 07/10/2018   • Colonoscopy  03/07/2018    polyp/repeat 5 years, MAC ANESTHESIA   • Esophagogastroduodenoscopy  03/07/2018    reflux. MAC ANESTHESIA   • Eye surgery      RIGHT EYE, EYE MUSCLE   • Upper gastrointestinal endoscopy      WOKE DURING IV SEDATION       Medications:   Current Outpatient Medications   Medication Sig Dispense Refill   • oxyCODONE-acetaminophen (PERCOCET) 5-325 MG per tablet Take 1/2 tablet every six hours 60 tablet 0   • sertraline (ZOLOFT) 100 MG tablet Take 100 mg by mouth daily.     • gabapentin (NEURONTIN) 600 MG tablet TAKE 1/2  TABLET BY MOUTH EVERY MORNING AND 1 1/2 TABLET BY MOUTH EVERY EVENING 60 tablet 0   • tiZANidine (ZANAFLEX) 2 MG tablet TAKE 1/2 TO 1 TABLET BY MOUTH EVERY 8 HOURS AS NEEDED FOR PAIN 45 tablet 0   • ibuprofen (MOTRIN) 800 MG tablet Take 1 tablet by mouth every 8 hours as needed for Pain. 90 tablet 5   • Vitamin D, Ergocalciferol, 05610 units capsule Take 1 capsule by mouth 1 day a week. 12 capsule 0   • TRAZODONE HCL PO Take 50 mg by mouth nightly. (3) TABLETS AT HS     • levothyroxine (SYNTHROID, LEVOTHROID) 137 MCG tablet Take 1 tablet by mouth daily. 30 tablet 11   • DISPENSE Dispense One Wrap Around Lumbosacral Spinal Support brace.  Please size for patient. DX m54.41 1 each 0   • DISPENSE Dispense one TENS unit, leads, and pads for Fibromyalgia, Spinal, Muscular pain DX: m79.7, m54.6 1 Units 0   • omeprazole (PRILOSEC) 20 MG capsule Take 1 capsule by mouth 2 times daily (before meals). 180 capsule 3   • OLANZapine (ZYPREXA) 15 MG tablet Take 15 mg by mouth nightly.     • Cholecalciferol (VITAMIN D) 2000 units capsule Take 2,000 Units by mouth daily.     • Multiple Vitamins-Minerals (MULTIVITAMIN PO) Take 1 tablet by mouth daily.     • ZINC SULFATE PO Take 1 tablet by mouth daily.     • Ascorbic Acid (VITAMIN C PO) Take 1 tablet by mouth daily.     • MAGNESIUM PO Take 1 tablet by mouth daily.     • albuterol 108 (90 Base) MCG/ACT inhaler Inhale 2 puffs into the lungs every 4 hours as needed for Shortness of Breath or Wheezing. 1 Inhaler 0   • cetirizine (ZYRTEC) 10 MG tablet Take 1 tablet by mouth daily. 30 tablet 5   • montelukast (SINGULAIR) 10 MG tablet Take 1 tablet by mouth nightly. 30 tablet 5   • ondansetron (ZOFRAN ODT) 4 MG disintegrating tablet Take 1 tablet by mouth every 6 hours. 10 tablet 0   • albuterol (PROAIR HFA) 108 (90 BASE) MCG/ACT inhaler Inhale 2 puffs into the lungs every 4 hours as needed for Shortness of Breath or Wheezing. 1 Inhaler 0   • clonazePAM (KLONOPIN) 1 MG tablet PT TAKES 0.5  MG AM, 05 MG PM, 1 MG QHS AND TWO 0.5 MG PRN DAILY (Patient taking differently: 0.5 mg 3 times daily. ) 90 tablet 0   • Probiotic Product (PROBIOTIC DAILY PO) Take 1 capsule by mouth daily.      • diclofenac (VOLTAREN) 1 % gel Apply 4g to affected area QID prn pain 5 Tube 3     No current facility-administered medications for this visit.        Allergies:   ALLERGIES:  Cyclobenzaprine; Hydrocodone; Lactose intolerance (no food restrictions)    (food); Latex   (environmental); and Lamictal    Social History:   Social History     Socioeconomic History   • Marital status: /Civil Union     Spouse name: None   • Number of children: None   • Years of education: None   • Highest education level: None   Social Needs   • Financial resource strain: None   • Food insecurity - worry: None   • Food insecurity - inability: None   • Transportation needs - medical: None   • Transportation needs - non-medical: None   Occupational History   • Occupation: ssdi   Tobacco Use   • Smoking status: Current Every Day Smoker     Packs/day: 0.75     Types: Cigarettes   • Smokeless tobacco: Never Used   • Tobacco comment: has paperwork at home   Substance and Sexual Activity   • Alcohol use: No   • Drug use: No   • Sexual activity: None   Other Topics Concern   • None   Social History Narrative    Single and no children    Heavy menstrual cycles.    Has been Smoking 1 ppd and no ETOH       Family History:   Family History   Problem Relation Age of Onset   • Heart disease Mother    • Heart disease Father    • Depression Father    • Thyroid Sister         goiter   • Kidney disease Sister    • Irritable Bowel Syndrome Sister    • Irritable Bowel Syndrome Sister    • Irritable Bowel Syndrome Sister    • Colon Polyps Sister    • Cancer Sister         THYROID CANCER   • Irritable Bowel Syndrome Sister    • Thyroid Maternal Uncle    • Cancer, Colon Paternal Aunt    • Irritable Bowel Syndrome Paternal Uncle    • Irritable Bowel Syndrome  Paternal Uncle    • Irritable Bowel Syndrome Paternal Uncle        REVIEW OF SYSTEMS:   Patient appears alert and oriented  Patient denies burning, numbness, tingling and claudication pain bilateral  Patients denies pain with normal activities  Activity level: Remains physically active.      Physical Exam:  Visit Vitals  Temp 97.4 °F (36.3 °C) (Temporal Artery)   Ht 5' 7\" (1.702 m)   Wt 101.6 kg   BMI 35.08 kg/m²     General: Awake, alert and oriented x 3, in no apparent distress. Appears well nourished.    Dermatologic: Incision is intact. Ecchymosis is mild. No erythema, no acute SOI.    Vascular: Mild edema present to the surgical foot. Capillary perfusion is less than 3s to the toes.    Neurologic: Sensation is intact to light touch and 10g monofilament to the digits and is the same quality as the other foot.     Musculoskeletal: ROM fluid to the 1st MPJ. No calf pain. Tenderness to the achilles tendon right midsubstance.       Assessment:  4 week status post cheilectomy and hardware removal left foot    Plan: I had a lengthy discussion regarding the diagnosis and treatment options for this condition. I have recommended formal physical therapy. The patient was advised to call the office with any problems, questions or concerns that arise before next appointment.     Patient OK to resume most activities. Stay away from percussive activities for 2 more weeks.     In total, 10 minutes was spent with the patient. Greater than 50% was spent counseling patient on condition, treatment, and their course of care.       Berta Jesus

## 2021-04-01 NOTE — H&P ADULT - PROBLEM SELECTOR PLAN 2
check a1c  hold home metformin  monitor FS  ISS for coverage for now   may need to add long acting insulin/premeal if FS elevated on decadron monitor LFts on remdesivir  check coags

## 2021-04-01 NOTE — ED ADULT NURSE NOTE - CHIEF COMPLAINT QUOTE
tested positive for COVID x 7 days; CXR today at [facility] revealed bilateral pneumonia, per pt; c/o sob

## 2021-04-01 NOTE — H&P ADULT - NSHPPHYSICALEXAM_GEN_ALL_CORE
Vital Signs Last 24 Hrs  T(C): 36.8 (01 Apr 2021 07:37), Max: 38.3 (01 Apr 2021 00:58)  T(F): 98.3 (01 Apr 2021 07:37), Max: 101 (01 Apr 2021 00:58)  HR: 84 (01 Apr 2021 07:37) (84 - 100)  BP: 120/73 (01 Apr 2021 07:37) (116/71 - 136/89)  BP(mean): --  RR: 17 (01 Apr 2021 07:37) (17 - 20)  SpO2: 95% (01 Apr 2021 07:37) (93% - 100%)    PHYSICAL EXAM:  GENERAL: NAD, breathing normal  HEAD:  Atraumatic, Normocephalic  EYES: conjunctiva and sclera clear  NECK: supple, No JVD  CHEST/LUNG: poor air entry, b/l crackles   HEART: S1 S2 RRR  ABDOMEN: +BS Soft, NT/ND  EXTREMITIES:  2+ DP Pulses, No c/c. no LE edema  NEUROLOGY: AAOx3, no facial droop, movign all extremities  SKIN: No rashes or lesions Vital Signs Last 24 Hrs  T(C): 36.8 (01 Apr 2021 07:37), Max: 38.3 (01 Apr 2021 00:58)  T(F): 98.3 (01 Apr 2021 07:37), Max: 101 (01 Apr 2021 00:58)  HR: 84 (01 Apr 2021 07:37) (84 - 100)  BP: 120/73 (01 Apr 2021 07:37) (116/71 - 136/89)  BP(mean): --  RR: 17 (01 Apr 2021 07:37) (17 - 20)  SpO2: 95% (01 Apr 2021 07:37) (93% - 100%)    PHYSICAL EXAM:  GENERAL: NAD, breathing normal  HEAD:  Atraumatic, Normocephalic  EYES: conjunctiva and sclera clear  NECK: supple, No JVD  CHEST/LUNG: poor air entry, b/l crackles   HEART: S1 S2 RRR  ABDOMEN: +BS Soft, NT/ND  EXTREMITIES:  2+ DP Pulses, No c/c. no LE edema  NEUROLOGY: AAOx3, no facial droop, moving all extremities  SKIN: No rashes or lesions

## 2021-04-01 NOTE — H&P ADULT - PROBLEM SELECTOR PLAN 1
acute hypoxia on exertion -87% RA due to covid-19 infection   procalcitonin negative   will start remdesivir and continue decadron  CRP very elevated - will monitor crp, ddimer, ferritin Q48-Q72 hours acute hypoxia on exertion -87% RA due to covid-19 infection   procalcitonin negative   will start remdesivir and continue decadron  CRP very elevated - will monitor crp, ddimer, ferritin Q48-Q72 hours  diarrhea likely due to virus - IVF hydration

## 2021-04-01 NOTE — H&P ADULT - ASSESSMENT
39 y/o F PMHx DM type II, HTN presented to [facility] ED with complaint of SOB being admitted for acute hypoxic resp failure 2/2 COVID    #Acute hypoxic resp failure  #COVID-19.    -acute hypoxia on exertion -87% RA due to covid-19 infection   -procalcitonin negative   -continue remdesivir and continue decadron  -CRP very elevated - will monitor crp, ddimer, ferritin Q48-Q72 hours  -ID consulted, appreciate recs      #Transaminitis  -likely due to COVID infection  -monitor LFts on remdesivir  -check coags.    #Type 2 diabetes mellitus without complication, without long-term current use of insulin  -check a1c  -hold home metformin, states she had not taken medication in past year  -monitor FS  -ISS for coverage for now   -may need to add long acting insulin/premeal if FS elevated on decadron.  #Essential hypertension.  -monitor BP  -patient on procardia and lisinopril - continued with hold parameters.    #Prophylactic measure.   -dvt proph - lovenox

## 2021-04-01 NOTE — ED ADULT NURSE NOTE - NS ED NURSE PATIENT LEFT UNIT TIME
+ UCx with strep. Called in Keflex.
Attempted to leave message but mailbox full / concerning urine culture
Pt called and asked for medication for yeast infection/vaginal itching. Discussed with Dr. Agapito Aguilar, Diflucan, 150 mg one time, 0 refills. Pt informed and verbalized understanding.
Pt called due to my chart results of Beta B strep in UC. Discussed with Dr. Santi Duran, pt informed Dr. Santi Duran will e-scribe prescription to pharmacy listed in pts chart.
09:22

## 2021-04-01 NOTE — DISCHARGE NOTE NURSING/CASE MANAGEMENT/SOCIAL WORK - PATIENT PORTAL LINK FT
You can access the FollowMyHealth Patient Portal offered by [facility] by registering at the following website: [email or web address]. By joining [facility]’s FollowMyHealth portal, you will also be able to view your health information using other applications (apps) compatible with our system.

## 2021-04-01 NOTE — ED PROVIDER NOTE - NS ED ROS FT
REVIEW OF SYSTEMS:    CONSTITUTIONAL: No weakness, fevers or chills  EYES/ENT: No visual changes;  No vertigo or throat pain   NECK: No pain or stiffness  RESPIRATORY: +cough +shortness of breath  CARDIOVASCULAR: No chest pain or palpitations  GASTROINTESTINAL: +diarrhea no abdominal pain nausea or vomiting  GENITOURINARY: No dysuria, frequency or hematuria  NEUROLOGICAL: No numbness or weakness  SKIN: No itching, rashes

## 2021-04-01 NOTE — H&P ADULT - HISTORY OF PRESENT ILLNESS
40F h/o HTN, DM2, diagnosed with covid-19 infection 7 days ago p/w sob on exertion and cough progressive since then. cough productive of brown sputum. associated with fevers/chills. + diarrhea x 3 days - watery nonbloody. decreased po intake.

## 2021-04-01 NOTE — H&P ADULT - NSICDXFAMILYHX_GEN_ALL_CORE_FT
FAMILY HISTORY:  Father  Still living? Yes, Estimated age: Age Unknown  Family history of CVA, Age at diagnosis: Age Unknown  Family hx of hypertension, Age at diagnosis: Age Unknown    Mother  Still living? No  FHx: diabetes mellitus, Age at diagnosis: Age Unknown

## 2021-04-01 NOTE — H&P ADULT - NSHPREVIEWOFSYSTEMS_GEN_ALL_CORE
Review of Systems:   CONSTITUTIONAL: + fever, +chills  EYES: No eye pain, visual disturbances  ENMT:  No difficulty hearing,   NECK: No pain or stiffness  RESPIRATORY: + cough, + shortness of breath  CARDIOVASCULAR: No chest pain, palpitations,  GASTROINTESTINAL: No abdominal or epigastric pain. No nausea, no vomiting, +diarrhea  GENITOURINARY: No dysuria,   NEUROLOGICAL: No headaches,   SKIN: No rashes  ENDOCRINE: No heat or cold intolerance  MUSCULOSKELETAL: No joint pain or swelling;   PSYCHIATRIC: No depression,   ALLERY AND IMMUNOLOGIC: No hives or eczema

## 2021-04-01 NOTE — ED PROVIDER NOTE - CLINICAL SUMMARY MEDICAL DECISION MAKING FREE TEXT BOX
40F T2DM HTN recently COVID+ presenting with shortness of breath. Tachypneic poor air movement and lower O2 on ambulation. Will get labs, CXR, give decadron and admit.

## 2021-04-01 NOTE — H&P ADULT - PROBLEM SELECTOR PLAN 5
dvt proph - lovenox (discussed with patient and patient agrees to take)  d[facility]  [phone]

## 2021-04-01 NOTE — H&P ADULT - NSHPLABSRESULTS_GEN_ALL_CORE
LABS:                        10.1   3.94  )-----------( 259      ( 01 Apr 2021 02:06 )             33.4     04-01    133<L>  |  98  |  10  ----------------------------<  105<H>  3.7   |  23  |  0.91    Ca    8.6      01 Apr 2021 02:06  Phos  3.1     04-01  Mg     2.1     04-01    TPro  7.2  /  Alb  3.7  /  TBili  0.3  /  DBili  x   /  AST  94<H>  /  ALT  73<H>  /  AlkPhos  78  04-01              RADIOLOGY & ADDITIONAL TESTS:    Imaging Personally Reviewed: CXR film reviewed - b/l patch opacities c/w covid-19 infection  Consultant(s) Notes Reviewed:    Care Discussed with Consultants/Other Providers:

## 2021-04-01 NOTE — ED PROVIDER NOTE - OBJECTIVE STATEMENT
40F PMH HTN and T2DM presenting with shortness of breath after being diagnosed with COVID19 7 days ago. Patient reports that  is a private  and was exposed to COVID and tested positive. The patient and the rest of the family tested positive. Denies loss of smell or taste. However has had mild productive cough with brown colored sputum. Today has had worsening shortness of breath. [facility] was told has bilateral PNA. No fevers or chills. Has had about 15 episodes of diarrhea in the last 3 days. No recent antibiotic use. No chest pain, abdominal pain, nausea, vomiting or dysuria. Has had poor PO intake and appetite.     Meds:  Amlodipine  Lisinopril  Metformin

## 2021-04-01 NOTE — ED PROVIDER NOTE - ATTENDING CONTRIBUTION TO CARE
pt with known covid here with sob, hypoxia. on exam, pt is mildly tachypneic, NAD, diffuse crackles, saturating 91% on RA at rest, desaturates to mid 80s on ambulation. corrects w 2 L NC. cxr shows bilateral diffuse opacities concerning for covid pna. pt received decadron, IVF, remdesivir and will be admitted for further care. low concern for PE at this time as symptoms most consistent with infection.

## 2021-04-01 NOTE — H&P ADULT - NSICDXFAMILYHX_GEN_ALL_CORE_FT
FAMILY HISTORY:  Father  Still living? Yes, Estimated age: Age Unknown  Family hx of hypertension, Age at diagnosis: Age Unknown    Mother  Still living? No  FHx: diabetes mellitus, Age at diagnosis: Age Unknown

## 2021-04-01 NOTE — DISCHARGE NOTE PROVIDER - NSDCCPCAREPLAN_GEN_ALL_CORE_FT
PRINCIPAL DISCHARGE DIAGNOSIS  Diagnosis: COVID-19  Assessment and Plan of Treatment: You tested positive for COVID 19.  You no longer require hospitalization.  Please restrict activities outside of your home except for getting medical care.  Do not go to work, school, or public areas.  Avoid using public transportation, ride-sharing, or taxis.  Separate yourself from other people and animals in your home.  Call ahead before visiting your doctor.  Wear a facemask when you are around other people. Cover your cough and sneezes.  Clean your hands often.  Avoid sharing personal household items.  Clean all frequently touched surfaces daily.        SECONDARY DISCHARGE DIAGNOSES  Diagnosis: Diarrhea, unspecified type  Assessment and Plan of Treatment: improved    Diagnosis: Transaminitis  Assessment and Plan of Treatment: Transaminitis    Diagnosis: Essential hypertension  Assessment and Plan of Treatment: Essential hypertension    Diagnosis: Type 2 diabetes mellitus without complication, without long-term current use of insulin  Assessment and Plan of Treatment: Type 2 diabetes mellitus without complication, without long-term current use of insulin

## 2021-04-01 NOTE — ED PROVIDER NOTE - PHYSICAL EXAMINATION
PHYSICAL EXAM:  GENERAL: NAD,  HEAD: Atraumatic, Normocephalic  EYES: EOMI, PERRLA, conjunctiva and sclera clear  ENMT: Moist mucous membranes  NECK: Supple, No JVD  NERVOUS SYSTEM: Alert & Oriented X3, Good concentration; Motor Strength 5/5 B/L upper and lower extremities  CHEST/LUNG: Poor air entry bilaterally, tachypneic on ambulation O2 decreased from 91-92 to 87%  HEART: Regular rate and rhythm; S1 and S2; No murmurs, rubs, or gallops  ABDOMEN: Soft, Nontender, Nondistended: Bowel sounds present  EXTREMITIES: 2+ Peripheral Pulses, No edema  LYMPH: No lymphadenopathy noted  SKIN: No rashes or lesions

## 2021-04-01 NOTE — H&P ADULT - HISTORY OF PRESENT ILLNESS
41 y/o F PMHx DM type II, HTN presented to [facility] ED with complaint of SOB. Patient states she was diagnosed with COVID last Wednesday (3/24/21). States her  is a private  and had a positive exposure after which her  and everyone in the household tested positive. Patient states she has had mild productive cough with brown colored sputum. Overnight, she has had worsening shortness of breath. [facility] was told [facility] P[facility]. Admits to subjective fevers or chills. States he had about 15 episodes of diarrhea in the last 3 days, but now resolved. No recent antibiotic use, but states she did take 2 days of tamiflu after she was first diagnosed. No chest pain, abdominal pain, nausea, vomiting or dysuria. Has had poor PO intake and appetite.  Patient was found to be hypoxic to mid 80s upon ambulation, which resolved after being placed on 2L NC. Patient received 1st dose of remdesivir and dexamethasone at [facility] and was then transferred to [facility] for load management.

## 2021-04-01 NOTE — DISCHARGE NOTE PROVIDER - HOSPITAL COURSE
40F h/o HTN, DM2, diagnosed with covid-19 infection 7 days ago a/w covid - 19 pna     Problem/Plan - 1:  ·  Problem: COVID-19.  Plan: acute hypoxia on exertion -87% RA due to covid-19 infection   procalcitonin negative   will start remdesivir and continue decadron  CRP very elevated - will monitor crp, ddimer, ferritin Q48-Q72 hours  diarrhea likely due to virus - IVF hydration.      Problem/Plan - 2:  ·  Problem: Transaminitis. Plan: monitor LFts on remdesivir  check coags.       Problem/Plan - 3:  ·  Problem: Type 2 diabetes mellitus without complication, without long-term current use of insulin. Plan: check a1c  hold home metformin  monitor FS  ISS for coverage for now   may need to add long acting insulin/premeal if FS elevated on decadron.       Problem/Plan - 4:  ·  Problem: Essential hypertension. Plan: monitor BP  pharmacy clarfied medications   patient on procardia and lisinopril - continued with hold parameters.       Problem/Plan - 5:  ·  Problem: Prophylactic measure.  Plan: dvt proph - lovenox (discussed with patient and patient agrees to take)  Patient cleared for intra-hospital transfer to [facility]

## 2021-04-01 NOTE — H&P ADULT - PROBLEM SELECTOR PLAN 4
dvt proph - lovenox (discussed with patient and patient agrees to take)  Dr. M. Luke  Medicine Hospitalist  [phone] dvt proph - lovenox (discussed with patient and patient agrees to take)  d[facility]  [phone] monitor BP  pharmacy clarfied medications   patient on procardia and lisinopril - continued with hold parameters

## 2021-04-02 PROBLEM — E11.9 TYPE 2 DIABETES MELLITUS WITHOUT COMPLICATIONS: Chronic | Status: ACTIVE | Noted: 2021-04-01

## 2021-04-02 PROBLEM — I10 ESSENTIAL (PRIMARY) HYPERTENSION: Chronic | Status: ACTIVE | Noted: 2021-04-01

## 2021-04-02 LAB
ALBUMIN SERPL ELPH-MCNC: 2.5 G/DL — LOW (ref 3.3–5)
ALBUMIN SERPL ELPH-MCNC: 2.5 G/DL — LOW (ref 3.3–5)
ALP SERPL-CCNC: 77 U/L — SIGNIFICANT CHANGE UP (ref 40–120)
ALP SERPL-CCNC: 78 U/L — SIGNIFICANT CHANGE UP (ref 40–120)
ALT FLD-CCNC: 78 U/L — SIGNIFICANT CHANGE UP (ref 12–78)
ALT FLD-CCNC: 79 U/L — HIGH (ref 12–78)
ANION GAP SERPL CALC-SCNC: 6 MMOL/L — SIGNIFICANT CHANGE UP (ref 5–17)
AST SERPL-CCNC: 62 U/L — HIGH (ref 15–37)
AST SERPL-CCNC: 63 U/L — HIGH (ref 15–37)
BASOPHILS # BLD AUTO: 0 K/UL — SIGNIFICANT CHANGE UP (ref 0–0.2)
BASOPHILS NFR BLD AUTO: 0 % — SIGNIFICANT CHANGE UP (ref 0–2)
BILIRUB DIRECT SERPL-MCNC: 0.1 MG/DL — SIGNIFICANT CHANGE UP (ref 0.05–0.2)
BILIRUB INDIRECT FLD-MCNC: 0.3 MG/DL — SIGNIFICANT CHANGE UP (ref 0.2–1)
BILIRUB SERPL-MCNC: 0.3 MG/DL — SIGNIFICANT CHANGE UP (ref 0.2–1.2)
BILIRUB SERPL-MCNC: 0.4 MG/DL — SIGNIFICANT CHANGE UP (ref 0.2–1.2)
BUN SERPL-MCNC: 12 MG/DL — SIGNIFICANT CHANGE UP (ref 7–23)
CALCIUM SERPL-MCNC: 8.2 MG/DL — LOW (ref 8.5–10.1)
CHLORIDE SERPL-SCNC: 105 MMOL/L — SIGNIFICANT CHANGE UP (ref 96–108)
CO2 SERPL-SCNC: 27 MMOL/L — SIGNIFICANT CHANGE UP (ref 22–31)
COVID-19 SPIKE DOMAIN AB INTERP: NEGATIVE — SIGNIFICANT CHANGE UP
COVID-19 SPIKE DOMAIN ANTIBODY RESULT: 0.4 U/ML — SIGNIFICANT CHANGE UP
CREAT SERPL-MCNC: 0.67 MG/DL — SIGNIFICANT CHANGE UP (ref 0.5–1.3)
CRP SERPL-MCNC: 68 MG/L — HIGH
D DIMER BLD IA.RAPID-MCNC: 536 NG/ML DDU — HIGH
EOSINOPHIL # BLD AUTO: 0 K/UL — SIGNIFICANT CHANGE UP (ref 0–0.5)
EOSINOPHIL NFR BLD AUTO: 0 % — SIGNIFICANT CHANGE UP (ref 0–6)
ERYTHROCYTE [SEDIMENTATION RATE] IN BLOOD: 67 MM/HR — HIGH (ref 0–15)
GLUCOSE SERPL-MCNC: 129 MG/DL — HIGH (ref 70–99)
HCT VFR BLD CALC: 33 % — LOW (ref 34.5–45)
HGB BLD-MCNC: 10.2 G/DL — LOW (ref 11.5–15.5)
INR BLD: 1.14 RATIO — SIGNIFICANT CHANGE UP (ref 0.88–1.16)
LDH SERPL L TO P-CCNC: 479 U/L — HIGH (ref 50–242)
LYMPHOCYTES # BLD AUTO: 0.53 K/UL — LOW (ref 1–3.3)
LYMPHOCYTES # BLD AUTO: 9 % — LOW (ref 13–44)
MAGNESIUM SERPL-MCNC: 2.2 MG/DL — SIGNIFICANT CHANGE UP (ref 1.6–2.6)
MCHC RBC-ENTMCNC: 24.2 PG — LOW (ref 27–34)
MCHC RBC-ENTMCNC: 30.9 GM/DL — LOW (ref 32–36)
MCV RBC AUTO: 78.2 FL — LOW (ref 80–100)
MONOCYTES # BLD AUTO: 0.23 K/UL — SIGNIFICANT CHANGE UP (ref 0–0.9)
MONOCYTES NFR BLD AUTO: 4 % — SIGNIFICANT CHANGE UP (ref 2–14)
NEUTROPHILS # BLD AUTO: 5.03 K/UL — SIGNIFICANT CHANGE UP (ref 1.8–7.4)
NEUTROPHILS NFR BLD AUTO: 85 % — HIGH (ref 43–77)
NRBC # BLD: SIGNIFICANT CHANGE UP /100 WBCS (ref 0–0)
PHOSPHATE SERPL-MCNC: 2.4 MG/DL — LOW (ref 2.5–4.5)
PLATELET # BLD AUTO: 288 K/UL — SIGNIFICANT CHANGE UP (ref 150–400)
POTASSIUM SERPL-MCNC: 4.1 MMOL/L — SIGNIFICANT CHANGE UP (ref 3.5–5.3)
POTASSIUM SERPL-SCNC: 4.1 MMOL/L — SIGNIFICANT CHANGE UP (ref 3.5–5.3)
PROT SERPL-MCNC: 7 G/DL — SIGNIFICANT CHANGE UP (ref 6–8.3)
PROT SERPL-MCNC: 7 G/DL — SIGNIFICANT CHANGE UP (ref 6–8.3)
PROTHROM AB SERPL-ACNC: 13.3 SEC — SIGNIFICANT CHANGE UP (ref 10.6–13.6)
RBC # BLD: 4.22 M/UL — SIGNIFICANT CHANGE UP (ref 3.8–5.2)
RBC # FLD: 15.9 % — HIGH (ref 10.3–14.5)
SARS-COV-2 IGG+IGM SERPL QL IA: 0.4 U/ML — SIGNIFICANT CHANGE UP
SARS-COV-2 IGG+IGM SERPL QL IA: NEGATIVE — SIGNIFICANT CHANGE UP
SODIUM SERPL-SCNC: 138 MMOL/L — SIGNIFICANT CHANGE UP (ref 135–145)
WBC # BLD: 5.85 K/UL — SIGNIFICANT CHANGE UP (ref 3.8–10.5)
WBC # FLD AUTO: 5.85 K/UL — SIGNIFICANT CHANGE UP (ref 3.8–10.5)

## 2021-04-02 PROCEDURE — 99233 SBSQ HOSP IP/OBS HIGH 50: CPT

## 2021-04-02 PROCEDURE — 99222 1ST HOSP IP/OBS MODERATE 55: CPT

## 2021-04-02 RX ORDER — REMDESIVIR 5 MG/ML
100 INJECTION INTRAVENOUS EVERY 24 HOURS
Refills: 0 | Status: COMPLETED | OUTPATIENT
Start: 2021-04-02 | End: 2021-04-05

## 2021-04-02 RX ADMIN — Medication 600 MILLIGRAM(S): at 05:16

## 2021-04-02 RX ADMIN — REMDESIVIR 500 MILLIGRAM(S): 5 INJECTION INTRAVENOUS at 09:54

## 2021-04-02 RX ADMIN — Medication 600 MILLIGRAM(S): at 17:41

## 2021-04-02 RX ADMIN — Medication 325 MILLIGRAM(S): at 12:26

## 2021-04-02 RX ADMIN — Medication 1: at 12:25

## 2021-04-02 RX ADMIN — LISINOPRIL 20 MILLIGRAM(S): 2.5 TABLET ORAL at 05:16

## 2021-04-02 RX ADMIN — Medication 6 MILLIGRAM(S): at 05:16

## 2021-04-02 RX ADMIN — ENOXAPARIN SODIUM 40 MILLIGRAM(S): 100 INJECTION SUBCUTANEOUS at 12:27

## 2021-04-02 NOTE — CONSULT NOTE ADULT - SUBJECTIVE AND OBJECTIVE BOX
[facility]  INFECTIOUS DISEASES   =======================================================    [id number]  AYANNA SPENCE     CC: shortness of breath     HPI:  41 y/o woman with PMH of HTN, DM2 and obesity was transferred from [facility] due to positive COVID causing hypoxia and SOB. Patient states she was diagnosed with COVID on 3/24/21.  had COVID and after that everyone in house contracted it. She felt that her breathing is getting worse so came to ED. Initially she had severe diarrhea until 3 days ago that has resolved. No GI symptoms, appetite is fine and BM back to normal. She had her first dose of Remdesivir and Dexa in [facility] ED.    PAST MEDICAL & SURGICAL HISTORY:  Essential hypertension  Diabetes mellitus, type 2  History of     Social Hx: no smoking, ETOH or drugs     FAMILY HISTORY:  Family hx of hypertension (Father)  FHx: diabetes mellitus (Mother)  Family history of CVA (Father)    Allergies  No Known Allergies    Antibiotics:  dexAMETHasone     Tablet 6 milliGRAM(s) Oral daily  remdesivir  IVPB 100 milliGRAM(s) IV Intermittent every 24 hours    REVIEW OF SYSTEMS:  CONSTITUTIONAL:  No Fever or chills  HEENT:  No diplopia or blurred vision.  No sore throat or runny nose.  CARDIOVASCULAR:  No chest pain or SOB.  RESPIRATORY:  +cough, +shortness of breath  GASTROINTESTINAL:  No nausea, vomiting or diarrhea.  GENITOURINARY:  No dysuria, frequency or urgency. No Blood in urine  MUSCULOSKELETAL:  no joint aches, no muscle pain  SKIN:  No change in skin, hair or nails.  NEUROLOGIC:  No paresthesias, fasciculations, seizures or weakness.  PSYCHIATRIC:  No disorder of thought or mood.  ENDOCRINE:  No heat or cold intolerance, polyuria or polydipsia.  HEMATOLOGICAL:  No easy bruising or bleeding.     Physical Exam:  Vital Signs Last 24 Hrs  T(C): 37.3 (2021 05:40), Max: 37.3 (2021 05:40)  T(F): 99.2 (2021 05:40), Max: 99.2 (2021 05:40)  HR: 96 (2021 05:40) (86 - 96)  BP: 131/81 (2021 05:40) (102/69 - 131/81)  BP(mean): --  RR: 18 (2021 05:40) (18 - 18)  SpO2: 92% (2021 05:40) (92% - 95%)  GEN: NAD, obese   HEENT: normocephalic and atraumatic. EOMI. PERRL.    NECK: Supple.  No lymphadenopathy   LUNGS: Clear to auscultation.  HEART: Regular rate and rhythm   ABDOMEN: Soft, nontender, and nondistended.  Positive bowel sounds.    EXTREMITIES: Without edema.  NEUROLOGIC: grossly intact.  PSYCHIATRIC: Appropriate affect .  SKIN: No rash    Labs:      138  |  105  |  12  ----------------------------<  129<H>  4.1   |  27  |  0.67    Ca    8.2<L>      2021 09:36  Phos  2.4     -  Mg     2.2         TPro  7.0  /  Alb  2.5<L>  /  TBili  0.3  /  DBili  .10  /  AST  63<H>  /  ALT  79<H>  /  AlkPhos  77  02                        10.2   5.85  )-----------( 288      ( 2021 09:36 )             33.0     PT/INR - ( 2021 09:36 )   PT: 13.3 sec;   INR: 1.14 ratio      LIVER FUNCTIONS - ( 2021 09:36 )  Alb: 2.5 g/dL / Pro: 7.0 g/dL / ALK PHOS: 77 U/L / ALT: 79 U/L / AST: 63 U/L / GGT: x           RECENT CULTURES:   @ 02:04      Detected    All imaging and other data have been reviewed.  < from: Xray Chest 1 View- PORTABLE-Urgent (Xray Chest 1 View- PORTABLE-Urgent .) (21 @ 02:14) >  IMPRESSION:  Bilateral patchy opacities, which may be secondary to infection, including COVID pneumonia.    Assessment and Plan:   41 y/o woman with PMH of HTN, DM2 and obesity was transferred from [facility] due to positive COVID causing hypoxia and SOB. Patient states she was diagnosed with COVID on 3/24/21.  Treatment usually is different case by case, data suggest that these might work:   Remdisivir:  5 day course , ALT < 5X ULN  Steroid: For hypoxic patients on supplemental O2 of intubated. dexamethasone 6mg PO or IV Q-day x 10 days (equivalent to solumedrol 32mg IV or Prednisone 40mg)  Anticoagulation:  with prophylactic dosing, full dose to be considered in patients with increased risk for thromboembolic complications. Bleeding can happen but acceptable in high risk patients due to hypercoagulable state.  LMWH is good, for high risk patients consider discharge on oral anticoagulation with rivaroxaban (Xarelto) 10mg PO QD or Eliquis (apixaban) 2.5-5mg PO BID  x 4 weeks.  Currently data and recommendations for COVID-19 treatment are rapidly changing, so this treatment plan is based on my clinical judgement with available information.     COVID 19   - BMP, CBC w diff, NLR. Procalcitonin, Ferritin, CRP, LDH and D dimer for the start and periodically can be repeated.   - CXR with bilateral opacities more consistent with viral pneumonia   - Started Remdesivir 200mg stat yeseterday, continue 100mg daily for 4 more days (total 5days) or until discharge whichever comes first.   - Continue Dexamethasone 6mg po daily for 10days  - Follow Creat and LFTs daily while on Remdesivir.    - Watch O2 sat closely and taper as tolerated.   - No antibiotics at this time.  - Prophylactic anticoagulation due to hypercoagulable state    Thank you for courtesy of this consult.     Will follow.     Dr. Rene Miller   Infectious Diseases   Please call [phone] between 8am and 6pm, call [phone] after 6pm or weekends.

## 2021-04-03 LAB
ALBUMIN SERPL ELPH-MCNC: 2.4 G/DL — LOW (ref 3.3–5)
ALBUMIN SERPL ELPH-MCNC: 2.4 G/DL — LOW (ref 3.3–5)
ALP SERPL-CCNC: 72 U/L — SIGNIFICANT CHANGE UP (ref 40–120)
ALP SERPL-CCNC: 74 U/L — SIGNIFICANT CHANGE UP (ref 40–120)
ALT FLD-CCNC: 71 U/L — SIGNIFICANT CHANGE UP (ref 12–78)
ALT FLD-CCNC: 72 U/L — SIGNIFICANT CHANGE UP (ref 12–78)
ANION GAP SERPL CALC-SCNC: 5 MMOL/L — SIGNIFICANT CHANGE UP (ref 5–17)
AST SERPL-CCNC: 39 U/L — HIGH (ref 15–37)
AST SERPL-CCNC: 42 U/L — HIGH (ref 15–37)
BASOPHILS # BLD AUTO: 0 K/UL — SIGNIFICANT CHANGE UP (ref 0–0.2)
BASOPHILS NFR BLD AUTO: 0 % — SIGNIFICANT CHANGE UP (ref 0–2)
BILIRUB DIRECT SERPL-MCNC: <.1 MG/DL — SIGNIFICANT CHANGE UP (ref 0.05–0.2)
BILIRUB INDIRECT FLD-MCNC: >0.2 MG/DL — SIGNIFICANT CHANGE UP (ref 0.2–1)
BILIRUB SERPL-MCNC: 0.3 MG/DL — SIGNIFICANT CHANGE UP (ref 0.2–1.2)
BILIRUB SERPL-MCNC: 0.3 MG/DL — SIGNIFICANT CHANGE UP (ref 0.2–1.2)
BUN SERPL-MCNC: 13 MG/DL — SIGNIFICANT CHANGE UP (ref 7–23)
CALCIUM SERPL-MCNC: 8.4 MG/DL — LOW (ref 8.5–10.1)
CHLORIDE SERPL-SCNC: 105 MMOL/L — SIGNIFICANT CHANGE UP (ref 96–108)
CO2 SERPL-SCNC: 30 MMOL/L — SIGNIFICANT CHANGE UP (ref 22–31)
CREAT SERPL-MCNC: 0.68 MG/DL — SIGNIFICANT CHANGE UP (ref 0.5–1.3)
EOSINOPHIL # BLD AUTO: 0.02 K/UL — SIGNIFICANT CHANGE UP (ref 0–0.5)
EOSINOPHIL NFR BLD AUTO: 1 % — SIGNIFICANT CHANGE UP (ref 0–6)
GLUCOSE SERPL-MCNC: 141 MG/DL — HIGH (ref 70–99)
HCT VFR BLD CALC: 32.8 % — LOW (ref 34.5–45)
HGB BLD-MCNC: 10 G/DL — LOW (ref 11.5–15.5)
INR BLD: 1.08 RATIO — SIGNIFICANT CHANGE UP (ref 0.88–1.16)
LYMPHOCYTES # BLD AUTO: 0.65 K/UL — LOW (ref 1–3.3)
LYMPHOCYTES # BLD AUTO: 27 % — SIGNIFICANT CHANGE UP (ref 13–44)
MCHC RBC-ENTMCNC: 24.3 PG — LOW (ref 27–34)
MCHC RBC-ENTMCNC: 30.5 GM/DL — LOW (ref 32–36)
MCV RBC AUTO: 79.8 FL — LOW (ref 80–100)
MONOCYTES # BLD AUTO: 0.22 K/UL — SIGNIFICANT CHANGE UP (ref 0–0.9)
MONOCYTES NFR BLD AUTO: 9 % — SIGNIFICANT CHANGE UP (ref 2–14)
NEUTROPHILS # BLD AUTO: 1.34 K/UL — LOW (ref 1.8–7.4)
NEUTROPHILS NFR BLD AUTO: 54 % — SIGNIFICANT CHANGE UP (ref 43–77)
NRBC # BLD: SIGNIFICANT CHANGE UP /100 WBCS (ref 0–0)
PLATELET # BLD AUTO: 315 K/UL — SIGNIFICANT CHANGE UP (ref 150–400)
POTASSIUM SERPL-MCNC: 4.3 MMOL/L — SIGNIFICANT CHANGE UP (ref 3.5–5.3)
POTASSIUM SERPL-SCNC: 4.3 MMOL/L — SIGNIFICANT CHANGE UP (ref 3.5–5.3)
PROT SERPL-MCNC: 6.6 G/DL — SIGNIFICANT CHANGE UP (ref 6–8.3)
PROT SERPL-MCNC: 6.7 G/DL — SIGNIFICANT CHANGE UP (ref 6–8.3)
PROTHROM AB SERPL-ACNC: 12.6 SEC — SIGNIFICANT CHANGE UP (ref 10.6–13.6)
RBC # BLD: 4.11 M/UL — SIGNIFICANT CHANGE UP (ref 3.8–5.2)
RBC # FLD: 15.9 % — HIGH (ref 10.3–14.5)
SODIUM SERPL-SCNC: 140 MMOL/L — SIGNIFICANT CHANGE UP (ref 135–145)
WBC # BLD: 2.4 K/UL — LOW (ref 3.8–10.5)
WBC # FLD AUTO: 2.4 K/UL — LOW (ref 3.8–10.5)

## 2021-04-03 PROCEDURE — 99233 SBSQ HOSP IP/OBS HIGH 50: CPT

## 2021-04-03 RX ADMIN — Medication 2: at 12:16

## 2021-04-03 RX ADMIN — Medication 30 MILLIGRAM(S): at 20:45

## 2021-04-03 RX ADMIN — REMDESIVIR 500 MILLIGRAM(S): 5 INJECTION INTRAVENOUS at 10:20

## 2021-04-03 RX ADMIN — Medication 325 MILLIGRAM(S): at 11:01

## 2021-04-03 RX ADMIN — Medication 6 MILLIGRAM(S): at 05:20

## 2021-04-03 RX ADMIN — LISINOPRIL 20 MILLIGRAM(S): 2.5 TABLET ORAL at 05:20

## 2021-04-03 RX ADMIN — Medication 600 MILLIGRAM(S): at 05:21

## 2021-04-03 RX ADMIN — Medication 600 MILLIGRAM(S): at 17:01

## 2021-04-03 RX ADMIN — ENOXAPARIN SODIUM 40 MILLIGRAM(S): 100 INJECTION SUBCUTANEOUS at 11:01

## 2021-04-04 LAB
ALBUMIN SERPL ELPH-MCNC: 2.3 G/DL — LOW (ref 3.3–5)
ALBUMIN SERPL ELPH-MCNC: 2.3 G/DL — LOW (ref 3.3–5)
ALP SERPL-CCNC: 66 U/L — SIGNIFICANT CHANGE UP (ref 40–120)
ALP SERPL-CCNC: 68 U/L — SIGNIFICANT CHANGE UP (ref 40–120)
ALT FLD-CCNC: 66 U/L — SIGNIFICANT CHANGE UP (ref 12–78)
ALT FLD-CCNC: 67 U/L — SIGNIFICANT CHANGE UP (ref 12–78)
ANION GAP SERPL CALC-SCNC: 7 MMOL/L — SIGNIFICANT CHANGE UP (ref 5–17)
AST SERPL-CCNC: 35 U/L — SIGNIFICANT CHANGE UP (ref 15–37)
AST SERPL-CCNC: 36 U/L — SIGNIFICANT CHANGE UP (ref 15–37)
BASOPHILS # BLD AUTO: 0.01 K/UL — SIGNIFICANT CHANGE UP (ref 0–0.2)
BASOPHILS NFR BLD AUTO: 0.3 % — SIGNIFICANT CHANGE UP (ref 0–2)
BILIRUB DIRECT SERPL-MCNC: 0.1 MG/DL — SIGNIFICANT CHANGE UP (ref 0.05–0.2)
BILIRUB INDIRECT FLD-MCNC: 0.2 MG/DL — SIGNIFICANT CHANGE UP (ref 0.2–1)
BILIRUB SERPL-MCNC: 0.3 MG/DL — SIGNIFICANT CHANGE UP (ref 0.2–1.2)
BILIRUB SERPL-MCNC: 0.3 MG/DL — SIGNIFICANT CHANGE UP (ref 0.2–1.2)
BUN SERPL-MCNC: 17 MG/DL — SIGNIFICANT CHANGE UP (ref 7–23)
CALCIUM SERPL-MCNC: 8.1 MG/DL — LOW (ref 8.5–10.1)
CHLORIDE SERPL-SCNC: 106 MMOL/L — SIGNIFICANT CHANGE UP (ref 96–108)
CO2 SERPL-SCNC: 27 MMOL/L — SIGNIFICANT CHANGE UP (ref 22–31)
CREAT SERPL-MCNC: 0.58 MG/DL — SIGNIFICANT CHANGE UP (ref 0.5–1.3)
EOSINOPHIL # BLD AUTO: 0.01 K/UL — SIGNIFICANT CHANGE UP (ref 0–0.5)
EOSINOPHIL NFR BLD AUTO: 0.3 % — SIGNIFICANT CHANGE UP (ref 0–6)
GLUCOSE SERPL-MCNC: 97 MG/DL — SIGNIFICANT CHANGE UP (ref 70–99)
HCT VFR BLD CALC: 31.9 % — LOW (ref 34.5–45)
HGB BLD-MCNC: 9.8 G/DL — LOW (ref 11.5–15.5)
IMM GRANULOCYTES NFR BLD AUTO: 2.4 % — HIGH (ref 0–1.5)
INR BLD: 1.14 RATIO — SIGNIFICANT CHANGE UP (ref 0.88–1.16)
LYMPHOCYTES # BLD AUTO: 1.49 K/UL — SIGNIFICANT CHANGE UP (ref 1–3.3)
LYMPHOCYTES # BLD AUTO: 39.6 % — SIGNIFICANT CHANGE UP (ref 13–44)
MCHC RBC-ENTMCNC: 24.4 PG — LOW (ref 27–34)
MCHC RBC-ENTMCNC: 30.7 GM/DL — LOW (ref 32–36)
MCV RBC AUTO: 79.6 FL — LOW (ref 80–100)
MONOCYTES # BLD AUTO: 0.43 K/UL — SIGNIFICANT CHANGE UP (ref 0–0.9)
MONOCYTES NFR BLD AUTO: 11.4 % — SIGNIFICANT CHANGE UP (ref 2–14)
NEUTROPHILS # BLD AUTO: 1.73 K/UL — LOW (ref 1.8–7.4)
NEUTROPHILS NFR BLD AUTO: 46 % — SIGNIFICANT CHANGE UP (ref 43–77)
NRBC # BLD: 0 /100 WBCS — SIGNIFICANT CHANGE UP (ref 0–0)
PLATELET # BLD AUTO: 328 K/UL — SIGNIFICANT CHANGE UP (ref 150–400)
POTASSIUM SERPL-MCNC: 3.4 MMOL/L — LOW (ref 3.5–5.3)
POTASSIUM SERPL-SCNC: 3.4 MMOL/L — LOW (ref 3.5–5.3)
PROT SERPL-MCNC: 6.2 G/DL — SIGNIFICANT CHANGE UP (ref 6–8.3)
PROT SERPL-MCNC: 6.3 G/DL — SIGNIFICANT CHANGE UP (ref 6–8.3)
PROTHROM AB SERPL-ACNC: 13.2 SEC — SIGNIFICANT CHANGE UP (ref 10.6–13.6)
RBC # BLD: 4.01 M/UL — SIGNIFICANT CHANGE UP (ref 3.8–5.2)
RBC # FLD: 15.7 % — HIGH (ref 10.3–14.5)
SODIUM SERPL-SCNC: 140 MMOL/L — SIGNIFICANT CHANGE UP (ref 135–145)
WBC # BLD: 3.76 K/UL — LOW (ref 3.8–10.5)
WBC # FLD AUTO: 3.76 K/UL — LOW (ref 3.8–10.5)

## 2021-04-04 PROCEDURE — 99232 SBSQ HOSP IP/OBS MODERATE 35: CPT

## 2021-04-04 RX ORDER — POTASSIUM CHLORIDE 20 MEQ
40 PACKET (EA) ORAL ONCE
Refills: 0 | Status: COMPLETED | OUTPATIENT
Start: 2021-04-04 | End: 2021-04-04

## 2021-04-04 RX ADMIN — REMDESIVIR 500 MILLIGRAM(S): 5 INJECTION INTRAVENOUS at 10:49

## 2021-04-04 RX ADMIN — Medication 325 MILLIGRAM(S): at 11:33

## 2021-04-04 RX ADMIN — Medication 600 MILLIGRAM(S): at 05:14

## 2021-04-04 RX ADMIN — Medication 600 MILLIGRAM(S): at 17:27

## 2021-04-04 RX ADMIN — Medication 1: at 21:45

## 2021-04-04 RX ADMIN — ENOXAPARIN SODIUM 40 MILLIGRAM(S): 100 INJECTION SUBCUTANEOUS at 11:33

## 2021-04-04 RX ADMIN — Medication 40 MILLIEQUIVALENT(S): at 08:56

## 2021-04-04 RX ADMIN — Medication 30 MILLIGRAM(S): at 21:45

## 2021-04-04 RX ADMIN — Medication 6 MILLIGRAM(S): at 05:14

## 2021-04-04 RX ADMIN — LISINOPRIL 20 MILLIGRAM(S): 2.5 TABLET ORAL at 05:14

## 2021-04-04 NOTE — PROGRESS NOTE ADULT - ASSESSMENT
39 y/o F PMHx DM type II, HTN presented to [facility] ED with complaint of SOB being admitted for acute hypoxic resp failure 2/2 COVID    #Acute hypoxic resp failure  #COVID-19.    -acute hypoxia on exertion -87% RA due to covid-19 infection on admission  -procalcitonin negative   -continue remdesivir and continue decadron  -CRP elevated - will monitor crp, ddimer, ferritin Q48-Q72 hours or as clinically indicated  -ID consulted, appreciate recs    #Transaminitis  -likely due to COVID infection  -monitor LFTs on remdesivir, stable  -check coags.    #Type 2 diabetes mellitus without complication, without long-term current use of insulin  -check a1c- 6.5  -hold home metformin, states she had not taken medication in past year  -monitor FS  -ISS for coverage for now   -may need to add long acting insulin/premeal if FS elevated on decadron.    #Essential hypertension.  -monitor BP  -patient on procardia and lisinopril - continued with hold parameters.    #Prophylactic measure.   -dvt proph - lovenox     Discussed with sister over phone
39 y/o F PMHx DM type II, HTN presented to [facility] ED with complaint of SOB being admitted for acute hypoxic resp failure 2/2 COVID    #Acute hypoxic resp failure  #COVID-19.    -acute hypoxia on exertion -87% RA due to covid-19 infection on admission  -procalcitonin negative   -continue remdesivir and continue decadron  -CRP elevated - will monitor crp, ddimer, ferritin Q48-Q72 hours or as clinically indicated  -ID consulted, appreciate recs    #Transaminitis  -likely due to COVID infection  -monitor LFTs on remdesivir, stable  -check coags.    #Type 2 diabetes mellitus without complication, without long-term current use of insulin  -check a1c- 6.5  -hold home metformin, states she had not taken medication in past year  -monitor FS  -ISS for coverage for now   -may need to add long acting insulin/premeal if FS elevated on decadron.    #Essential hypertension.  -monitor BP  -patient on procardia and lisinopril - continued with hold parameters.    #Prophylactic measure.   -dvt proph - lovenox     Discussed with sister over phone
39 y/o F PMHx DM type II, HTN presented to [facility] ED with complaint of SOB being admitted for acute hypoxic resp failure 2/2 COVID    #Acute hypoxic resp failure  #COVID-19.    -acute hypoxia on exertion -87% RA due to covid-19 infection on admission  -procalcitonin negative   -continue remdesivir and continue decadron  -CRP elevated - will monitor crp, ddimer, ferritin Q48-Q72 hours  -ID consulted, appreciate recs    #Transaminitis  -likely due to COVID infection  -monitor LFTs on remdesivir  -check coags.    #Type 2 diabetes mellitus without complication, without long-term current use of insulin  -check a1c- 6.5  -hold home metformin, states she had not taken medication in past year  -monitor FS  -ISS for coverage for now   -may need to add long acting insulin/premeal if FS elevated on decadron.    #Essential hypertension.  -monitor BP  -patient on procardia and lisinopril - continued with hold parameters.    #Prophylactic measure.   -dvt proph - lovenox

## 2021-04-05 ENCOUNTER — TRANSCRIPTION ENCOUNTER (OUTPATIENT)
Age: 41
End: 2021-04-05

## 2021-04-05 VITALS — RESPIRATION RATE: 18 BRPM | OXYGEN SATURATION: 98 %

## 2021-04-05 LAB
CRP SERPL-MCNC: 16 MG/L — HIGH
FERRITIN SERPL-MCNC: 156 NG/ML — HIGH (ref 15–150)
HCT VFR BLD CALC: 35.5 % — SIGNIFICANT CHANGE UP (ref 34.5–45)
HGB BLD-MCNC: 10.7 G/DL — LOW (ref 11.5–15.5)
INR BLD: 1.14 RATIO — SIGNIFICANT CHANGE UP (ref 0.88–1.16)
LDH SERPL L TO P-CCNC: 540 U/L — HIGH (ref 50–242)
MCHC RBC-ENTMCNC: 24.3 PG — LOW (ref 27–34)
MCHC RBC-ENTMCNC: 30.1 GM/DL — LOW (ref 32–36)
MCV RBC AUTO: 80.5 FL — SIGNIFICANT CHANGE UP (ref 80–100)
PLATELET # BLD AUTO: 419 K/UL — HIGH (ref 150–400)
PROTHROM AB SERPL-ACNC: 13.3 SEC — SIGNIFICANT CHANGE UP (ref 10.6–13.6)
RBC # BLD: 4.41 M/UL — SIGNIFICANT CHANGE UP (ref 3.8–5.2)
RBC # FLD: 15.8 % — HIGH (ref 10.3–14.5)
WBC # BLD: 4.66 K/UL — SIGNIFICANT CHANGE UP (ref 3.8–10.5)
WBC # FLD AUTO: 4.66 K/UL — SIGNIFICANT CHANGE UP (ref 3.8–10.5)

## 2021-04-05 PROCEDURE — 86140 C-REACTIVE PROTEIN: CPT

## 2021-04-05 PROCEDURE — 99232 SBSQ HOSP IP/OBS MODERATE 35: CPT

## 2021-04-05 PROCEDURE — 86769 SARS-COV-2 COVID-19 ANTIBODY: CPT

## 2021-04-05 PROCEDURE — 85025 COMPLETE CBC W/AUTO DIFF WBC: CPT

## 2021-04-05 PROCEDURE — 99239 HOSP IP/OBS DSCHRG MGMT >30: CPT

## 2021-04-05 PROCEDURE — 80076 HEPATIC FUNCTION PANEL: CPT

## 2021-04-05 PROCEDURE — 84100 ASSAY OF PHOSPHORUS: CPT

## 2021-04-05 PROCEDURE — 85379 FIBRIN DEGRADATION QUANT: CPT

## 2021-04-05 PROCEDURE — 36415 COLL VENOUS BLD VENIPUNCTURE: CPT

## 2021-04-05 PROCEDURE — 82728 ASSAY OF FERRITIN: CPT

## 2021-04-05 PROCEDURE — 82565 ASSAY OF CREATININE: CPT

## 2021-04-05 PROCEDURE — 83615 LACTATE (LD) (LDH) ENZYME: CPT

## 2021-04-05 PROCEDURE — 80053 COMPREHEN METABOLIC PANEL: CPT

## 2021-04-05 PROCEDURE — 85652 RBC SED RATE AUTOMATED: CPT

## 2021-04-05 PROCEDURE — 82962 GLUCOSE BLOOD TEST: CPT

## 2021-04-05 PROCEDURE — 85610 PROTHROMBIN TIME: CPT

## 2021-04-05 PROCEDURE — 83735 ASSAY OF MAGNESIUM: CPT

## 2021-04-05 RX ORDER — RIVAROXABAN 15 MG-20MG
1 KIT ORAL
Qty: 30 | Refills: 0
Start: 2021-04-05 | End: 2021-05-04

## 2021-04-05 RX ORDER — DEXAMETHASONE 0.5 MG/5ML
1 ELIXIR ORAL
Qty: 5 | Refills: 0
Start: 2021-04-05 | End: 2021-04-09

## 2021-04-05 RX ADMIN — ENOXAPARIN SODIUM 40 MILLIGRAM(S): 100 INJECTION SUBCUTANEOUS at 10:37

## 2021-04-05 RX ADMIN — Medication 600 MILLIGRAM(S): at 06:22

## 2021-04-05 RX ADMIN — REMDESIVIR 500 MILLIGRAM(S): 5 INJECTION INTRAVENOUS at 10:37

## 2021-04-05 RX ADMIN — Medication 325 MILLIGRAM(S): at 10:37

## 2021-04-05 RX ADMIN — Medication 6 MILLIGRAM(S): at 06:22

## 2021-04-05 RX ADMIN — LISINOPRIL 20 MILLIGRAM(S): 2.5 TABLET ORAL at 06:22

## 2021-04-05 RX ADMIN — Medication 1: at 11:34

## 2021-04-05 NOTE — DISCHARGE NOTE NURSING/CASE MANAGEMENT/SOCIAL WORK - PATIENT PORTAL LINK FT
You can access the FollowMyHealth Patient Portal offered by [facility] by registering at the following website: [email or web address]. By joining [employer]’s FollowMyHealth portal, you will also be able to view your health information using other applications (apps) compatible with our system.

## 2021-04-05 NOTE — DISCHARGE NOTE PROVIDER - NSDCCPCAREPLAN_GEN_ALL_CORE_FT
PRINCIPAL DISCHARGE DIAGNOSIS  Diagnosis: Pneumonia due to COVID-19 virus  Assessment and Plan of Treatment: You were found to have COVID19:  1. You should restrict activities outside your home, except for getting medical care.  2. Do not go to work, school, or public areas.  3. Avoid using public transportation, ride-sharing, or taxis.  4. Separate yourself from other people and animals in your home.  People: As much as possible, you should stay in a specific room and away from other people in your home. Also, you should use a separate bathroom, if available.  Animals: You should restrict contact with pets and other animals while you are sick with COVID19.   5.Call ahead before visiting your doctor.  6. Wear a facemask.  7. Cover your coughs and sneezes.  8. Clean your hands often. Wash your hands often with soap and water for at least 20 seconds, especially after blowing your nose, coughing, or sneezing; going to the bathroom; and before eating or preparing food. If soap and water are not readily available, use an alcohol-based hand  with at least 60% alcohol.  9. Avoid sharing personal household items.  You should not share dishes, drinking glasses, cups, eating utensils, towels, or bedding with other people or pets in your home.   10. Clean all “high-touch” surfaces everyday.  11. Monitor your symptoms. Seek prompt medical attention if your illness is worsening (e.g., difficulty breathing).   12. Discontinuing home isolation. Patients with confirmed COVID-19 should remain under home isolation precautions for 14 days since the positive COVID-19 test and until the risk of secondary transmission to others is thought to be low.   -Please continue to take decadron until 4/10/2021 and continue to take Xarelto 10mg daily for 30 days  - Please make an appointment for follow up with your primary doctor within 1-2 weeks  - Please return to the ED if you develop worsening symptoms or fever       PRINCIPAL DISCHARGE DIAGNOSIS  Diagnosis: Pneumonia due to COVID-19 virus  Assessment and Plan of Treatment: You were found to have COVID19:  1. You should restrict activities outside your home, except for getting medical care.  2. Do not go to work, school, or public areas.  3. Avoid using public transportation, ride-sharing, or taxis.  4. Separate yourself from other people and animals in your home.  People: As much as possible, you should stay in a specific room and away from other people in your home. Also, you should use a separate bathroom, if available.  Animals: You should restrict contact with pets and other animals while you are sick with COVID19.   5. Call ahead before visiting your doctor.  6. Wear a facemask.  7. Cover your coughs and sneezes.  8. Clean your hands often. Wash your hands often with soap and water for at least 20 seconds, especially after blowing your nose, coughing, or sneezing; going to the bathroom; and before eating or preparing food. If soap and water are not readily available, use an alcohol-based hand  with at least 60% alcohol.  9. Avoid sharing personal household items.  You should not share dishes, drinking glasses, cups, eating utensils, towels, or bedding with other people or pets in your home.   10. Clean all “high-touch” surfaces everyday.  11. Monitor your symptoms. Seek prompt medical attention if your illness is worsening (e.g., difficulty breathing).   12. Discontinuing home isolation. Patients with confirmed COVID-19 should remain under home isolation precautions for 14 days since the positive COVID-19 test and until the risk of secondary transmission to others is thought to be low.   -Please continue to take decadron until 4/10/2021 and continue to take Xarelto 10mg daily for 30 days  - Please make an appointment for follow up with your primary doctor within 1-2 weeks  - Please return to the ED if you develop worsening symptoms or fever

## 2021-04-05 NOTE — DISCHARGE NOTE PROVIDER - HOSPITAL COURSE
FROM ADMISSION H+P:   HPI:  39 y/o F PMHx DM type II, HTN presented to [facility] ED with complaint of SOB. Patient states she was diagnosed with COVID last Wednesday (3/24/21). States her  is a private  and had a positive exposure after which her  and everyone in the household tested positive. Patient states she has had mild productive cough with brown colored sputum. Overnight, she has had worsening shortness of breath. Went to [facility] had CXR which she was told has bilateral PNA. Admits to subjective fevers or chills. States he had about 15 episodes of diarrhea in the last 3 days, but now resolved. No recent antibiotic use, but states she did take 2 days of tamiflu after she was first diagnosed. No chest pain, abdominal pain, nausea, vomiting or dysuria. Has had poor PO intake and appetite.  Patient was found to be hypoxic to mid 80s upon ambulation, which resolved after being placed on 2L NC. Patient received 1st dose of remdesivir and dexamethasone at [facility] and was then transferred to [facility] for load management. (01 Apr 2021 18:45)      ---  HOSPITAL COURSE:         ---  CONSULTANTS:     ---  TIME SPENT:  I, the attending physician, was physically present for the key portions of the evaluation and management (E/M) service provided. The total amount of time spent reviewing the hospital notes, laboratory values, imaging findings, assessing/counseling the patient, discussing with consultant physicians, social work, nursing staff was -- minutes    ---  Primary care provider was made aware of plan for discharge:      [  ] NO     [  ] YES   FROM ADMISSION H+P:   HPI:  41 y/o F PMHx DM type II, HTN presented to [facility] ED with complaint of SOB. Patient states she was diagnosed with COVID last Wednesday (3/24/21). States her  is a private  and had a positive exposure after which her  and everyone in the household tested positive. Patient states she has had mild productive cough with brown colored sputum. Overnight, she has had worsening shortness of breath. Went to [facility] had CXR which she was told has bilateral PNA. Admits to subjective fevers or chills. States he had about 15 episodes of diarrhea in the last 3 days, but now resolved. No recent antibiotic use, but states she did take 2 days of tamiflu after she was first diagnosed. No chest pain, abdominal pain, nausea, vomiting or dysuria. Has had poor PO intake and appetite.  Patient was found to be hypoxic to mid 80s upon ambulation, which resolved after being placed on 2L NC. Patient received 1st dose of remdesivir and dexamethasone at [facility] and was then transferred to [facility] for load management. (01 Apr 2021 18:45)      ---  HOSPITAL COURSE:       Patient transferred to general medical floor for further management. Infectious disease, Dr. Miller consulted. COVID19 PCR positive. Patient was managed with supportive treatment including supplemental O2. Patient completed 5 day course of remdesivir and 5/10 days of decadron. Patient's respiratory status was monitored closely. Patient found to have elevated transaminases upon admission, likely due to covid19, which down trended. Patient medically optimized and hemodynamically stable for discharge with close follow up as an outpatient.    ---  CONSULTANTS:     Dr. Miller - ID    ---  TIME SPENT:  I, the attending physician, was physically present for the key portions of the evaluation and management (E/M) service provided. The total amount of time spent reviewing the hospital notes, laboratory values, imaging findings, assessing/counseling the patient, discussing with consultant physicians, social work, nursing staff was -- minutes    ---  Primary care provider was made aware of plan for discharge:      [  ] NO     [  ] YES   FROM ADMISSION H+P:   HPI:  39 y/o F PMHx DM type II, HTN presented to [facility] ED with complaint of SOB. Patient states she was diagnosed with COVID last Wednesday (3/24/21). States her  is a private  and had a positive exposure after which her  and everyone in the household tested positive. Patient states she has had mild productive cough with brown colored sputum. Overnight, she has had worsening shortness of breath. Went to [facility] had CXR which she was told has bilateral PNA. Admits to subjective fevers or chills. States he had about 15 episodes of diarrhea in the last 3 days, but now resolved. No recent antibiotic use, but states she did take 2 days of tamiflu after she was first diagnosed. No chest pain, abdominal pain, nausea, vomiting or dysuria. Has had poor PO intake and appetite.  Patient was found to be hypoxic to mid 80s upon ambulation, which resolved after being placed on 2L NC. Patient received 1st dose of remdesivir and dexamethasone at [facility] and was then transferred to [facility] for load management. (01 Apr 2021 18:45)      ---  HOSPITAL COURSE:       Patient transferred to general medical floor for further management. Infectious disease, Dr. Miller consulted. COVID19 PCR positive. Patient was managed with supportive treatment including supplemental O2. Patient completed 5 day course of remdesivir and 5/10 days of decadron. Patient's respiratory status was monitored closely. Patient found to have elevated transaminases upon admission, likely due to covid19, which down trended. Patient medically optimized and hemodynamically stable for discharge with close follow up as an outpatient.    Patient seen and examined at bedside on day of discharge.     ---  CONSULTANTS:     Dr. Miller - ID    ---  TIME SPENT:  I, the attending physician, was physically present for the key portions of the evaluation and management (E/M) service provided. The total amount of time spent reviewing the hospital notes, laboratory values, imaging findings, assessing/counseling the patient, discussing with consultant physicians, social work, nursing staff was 39 minutes    ---   FROM ADMISSION H+P:   HPI:  39 y/o F PMHx DM type II, HTN presented to [facility] ED with complaint of SOB. Patient states she was diagnosed with COVID last Wednesday (3/24/21). States her  is a private  and had a positive exposure after which her  and everyone in the household tested positive. Patient states she has had mild productive cough with brown colored sputum. Overnight, she has had worsening shortness of breath. Went to [facility] had CXR which she was told has bilateral PNA. Admits to subjective fevers or chills. States he had about 15 episodes of diarrhea in the last 3 days, but now resolved. No recent antibiotic use, but states she did take 2 days of tamiflu after she was first diagnosed. No chest pain, abdominal pain, nausea, vomiting or dysuria. Has had poor PO intake and appetite.  Patient was found to be hypoxic to mid 80s upon ambulation, which resolved after being placed on 2L NC. Patient received 1st dose of remdesivir and dexamethasone at [facility] and was then transferred to [facility] for load management. (01 Apr 2021 18:45)      ---  HOSPITAL COURSE:       Patient transferred to general medical floor for further management. Infectious disease, Dr. Miller consulted. COVID19 PCR positive. Patient was managed with supportive treatment including supplemental O2. Patient completed 5 day course of remdesivir and 5/10 days of decadron. Patient's respiratory status was monitored closely. Patient found to have elevated transaminases upon admission, likely due to covid19, which down trended. Patient medically optimized and hemodynamically stable for discharge with close follow up as an outpatient.    Patient seen and examined at bedside on day of discharge. States she feels well, denies any CP, SOB, abd pain. Eager to go home. Patient saturating >95% on RA at rest and at ambulation.     Vital Signs Last 24 Hrs  T(C): 36.5 (05 Apr 2021 12:12), Max: 36.6 (04 Apr 2021 19:16)  T(F): 97.7 (05 Apr 2021 12:12), Max: 97.9 (04 Apr 2021 19:16)  HR: 71 (05 Apr 2021 12:12) (68 - 78)  BP: 142/88 (05 Apr 2021 12:12) (112/76 - 142/88)  BP(mean): --  RR: 18 (05 Apr 2021 12:19) (18 - 19)  SpO2: 98% (05 Apr 2021 12:19) (97% - 98%)    General: Well developed, well nourished, NAD  HEENT: NCAT, PERRL   Neurology: A&Ox3, nonfocal  Respiratory: CTA B/L, No W/R/R  CV: RRR, +S1/S2, no murmurs, rubs or gallops  Abdominal: Soft, NT, ND +BSx4  Extremities: No C/C/E, + peripheral pulses  Skin: warm, dry    ---  CONSULTANTS:     Dr. Paul WORKMAN    ---  TIME SPENT:  I, the attending physician, was physically present for the key portions of the evaluation and management (E/M) service provided. The total amount of time spent reviewing the hospital notes, laboratory values, imaging findings, assessing/counseling the patient, discussing with consultant physicians, social work, nursing staff was 39 minutes    ---

## 2021-04-05 NOTE — PROGRESS NOTE ADULT - SUBJECTIVE AND OBJECTIVE BOX
INTERVAL HPI/OVERNIGHT EVENTS:  Patient seen and examined at bedside. States she feels well, just a bit tired/fatigued. Denies any active CP, SOB. Ambulated with patient in hallway and SpO2 dropped to 85% on RA, but improved to 94% within 1-2 minutes of resting.    MEDICATIONS  (STANDING):  dexAMETHasone     Tablet 6 milliGRAM(s) Oral daily  dextrose 40% Gel 15 Gram(s) Oral once  dextrose 5%. 1000 milliLiter(s) (50 mL/Hr) IV Continuous <Continuous>  dextrose 5%. 1000 milliLiter(s) (100 mL/Hr) IV Continuous <Continuous>  dextrose 50% Injectable 25 Gram(s) IV Push once  dextrose 50% Injectable 12.5 Gram(s) IV Push once  dextrose 50% Injectable 25 Gram(s) IV Push once  enoxaparin Injectable 40 milliGRAM(s) SubCutaneous daily  ferrous    sulfate 325 milliGRAM(s) Oral daily  glucagon  Injectable 1 milliGRAM(s) IntraMuscular once  guaiFENesin  milliGRAM(s) Oral every 12 hours  influenza   Vaccine 0.5 milliLiter(s) IntraMuscular once  insulin lispro (ADMELOG) corrective regimen sliding scale   SubCutaneous Before meals and at bedtime  lisinopril 20 milliGRAM(s) Oral daily  NIFEdipine XL 30 milliGRAM(s) Oral daily  remdesivir  IVPB 100 milliGRAM(s) IV Intermittent every 24 hours    MEDICATIONS  (PRN):  acetaminophen   Tablet .. 650 milliGRAM(s) Oral every 6 hours PRN Temp greater or equal to 38C (100.4F), Mild Pain (1 - 3)  benzonatate 100 milliGRAM(s) Oral every 8 hours PRN Cough      Allergies    No Known Allergies    Intolerances      ROS:  CONSTITUTIONAL: No weakness, fevers or chills  EYES/ENT: No visual changes;  No vertigo or throat pain   NECK: No pain or stiffness  RESPIRATORY: + cough, no wheezing, hemoptysis; + mild shortness of breath on exertion  CARDIOVASCULAR: No chest pain or palpitations  GASTROINTESTINAL: No abdominal or epigastric pain. No nausea, vomiting, or hematemesis  GENITOURINARY: No dysuria, frequency or hematuria  NEUROLOGICAL: No numbness or weakness  SKIN: No itching, burning, rashes, or lesions   All other review of systems is negative unless indicated above.    Vital Signs Last 24 Hrs  T(C): 36.4 (04 Apr 2021 11:40), Max: 36.7 (03 Apr 2021 20:38)  T(F): 97.5 (04 Apr 2021 11:40), Max: 98.1 (03 Apr 2021 20:38)  HR: 60 (04 Apr 2021 11:40) (60 - 76)  BP: 142/86 (04 Apr 2021 11:40) (127/73 - 142/86)  BP(mean): --  RR: 18 (04 Apr 2021 11:40) (18 - 19)  SpO2: 95% (04 Apr 2021 11:40) (92% - 95%)    04-03 @ 07:01  -  04-04 @ 07:00  --------------------------------------------------------  IN: 480 mL / OUT: 0 mL / NET: 480 mL    04-04 @ 07:01  -  04-04 @ 12:37  --------------------------------------------------------  IN: 250 mL / OUT: 0 mL / NET: 250 mL      Physical Exam:  General: WN/WD NAD  Neurology: A&Ox3, nonfocal, HEARN x 4  Respiratory: dec breath sounds B/L, no w/r/r  CV: RRR, S1S2  Abdominal: Soft, NT, ND +BS  Extremities: No edema, + peripheral pulses      LABS:                        9.8    3.76  )-----------( 328      ( 04 Apr 2021 07:25 )             31.9     04-04    140  |  106  |  17  ----------------------------<  97  3.4<L>   |  27  |  0.58    Ca    8.1<L>      04 Apr 2021 07:25    TPro  6.2  /  Alb  2.3<L>  /  TBili  0.3  /  DBili  .10  /  AST  36  /  ALT  66  /  AlkPhos  66  04-04    PT/INR - ( 04 Apr 2021 07:25 )   PT: 13.2 sec;   INR: 1.14 ratio               RADIOLOGY & ADDITIONAL TESTS:
[facility]  INFECTIOUS DISEASES   =======================================================    [id number]  AYANNA SPENCE     Follow up; COVID    Doing well, o2 sat has improved, more comfortable. No fever     PAST MEDICAL & SURGICAL HISTORY:  Essential hypertension  Diabetes mellitus, type 2  History of     Social Hx: no smoking, ETOH or drugs     FAMILY HISTORY:  Family hx of hypertension (Father)  FHx: diabetes mellitus (Mother)  Family history of CVA (Father)    Allergies  No Known Allergies    Antibiotics:  dexAMETHasone     Tablet 6 milliGRAM(s) Oral daily  remdesivir  IVPB 100 milliGRAM(s) IV Intermittent every 24 hours    REVIEW OF SYSTEMS:  CONSTITUTIONAL:  No Fever or chills  HEENT:  No diplopia or blurred vision.  No sore throat or runny nose.  CARDIOVASCULAR:  No chest pain or SOB.  RESPIRATORY:  +cough, +shortness of breath  GASTROINTESTINAL:  No nausea, vomiting or diarrhea.  GENITOURINARY:  No dysuria, frequency or urgency. No Blood in urine  MUSCULOSKELETAL:  no joint aches, no muscle pain  SKIN:  No change in skin, hair or nails.  NEUROLOGIC:  No paresthesias, fasciculations, seizures or weakness.  PSYCHIATRIC:  No disorder of thought or mood.  ENDOCRINE:  No heat or cold intolerance, polyuria or polydipsia.  HEMATOLOGICAL:  No easy bruising or bleeding.     Physical Exam:  Vital Signs Last 24 Hrs  T(C): 36.5 (2021 12:12), Max: 36.6 (2021 19:16)  T(F): 97.7 (2021 12:12), Max: 97.9 (2021 19:16)  HR: 71 (2021 12:12) (68 - 78)  BP: 142/88 (2021 12:12) (112/76 - 142/88)  BP(mean): --  RR: 18 (2021 12:19) (18 - 19)  SpO2: 98% (2021 12:19) (97% - 98%)  GEN: NAD, obese   HEENT: normocephalic and atraumatic. EOMI. PERRL.    NECK: Supple.  No lymphadenopathy   LUNGS: Clear to auscultation.  HEART: Regular rate and rhythm   ABDOMEN: Soft, nontender, and nondistended.  Positive bowel sounds.    EXTREMITIES: Without edema.  NEUROLOGIC: grossly intact.  PSYCHIATRIC: Appropriate affect .  SKIN: No rash    Labs:                        10.7   4.66  )-----------( 419      ( 2021 06:50 )             35.5         142  |  109<H>  |  17  ----------------------------<  99  4.2   |  29  |  0.70    Ca    8.3<L>      2021 06:50    TPro  6.9  /  Alb  2.5<L>  /  TBili  0.3  /  DBili  .10  /  AST  42<H>  /  ALT  80<H>  /  AlkPhos  71      WBC Count: 4.66 K/uL (21 @ 06:50)  WBC Count: 3.76 K/uL (21 @ 07:25)  WBC Count: 2.40 K/uL (21 @ 09:11)  WBC Count: 5.85 K/uL (21 @ 09:36)  WBC Count: 3.94 K/uL (21 @ 02:06)    Creatinine, Serum: 0.70 mg/dL (21 @ 06:50)  Creatinine, Serum: 0.58 mg/dL (21 @ 07:25)  Creatinine, Serum: 0.68 mg/dL (21 @ 09:11)  Creatinine, Serum: 0.67 mg/dL (21 @ 09:36)  Creatinine, Serum: 0.77 mg/dL (21 @ 20:47)  Creatinine, Serum: 0.91 mg/dL (21 @ 02:06)    C-Reactive Protein, Serum: 16 mg/L (21 @ 12:39)  C-Reactive Protein, Serum: 68 mg/L (21 @ 10:52)  C-Reactive Protein, Serum: 81 mg/L (21 @ 02:06)    Ferritin, Serum: 156 ng/mL (21 @ 08:27)  Ferritin, Serum: 236 ng/mL (21 @ 09:15)    Sedimentation Rate, Erythrocyte: 67 mm/hr (21 @ 09:36)    Procalcitonin, Serum: 0.09 ng/mL (21 @ 02:06)     SARS-CoV-2: Detected (21 @ 02:04)  Rapid RVP Result: Detected (21 @ 02:04)    All imaging and other data have been reviewed.  < from: Xray Chest 1 View- PORTABLE-Urgent (Xray Chest 1 View- PORTABLE-Urgent .) (21 @ 02:14) >  IMPRESSION:  Bilateral patchy opacities, which may be secondary to infection, including COVID pneumonia.    Assessment and Plan:   41 y/o woman with PMH of HTN, DM2 and obesity was transferred from [facility] due to positive COVID causing hypoxia and SOB. Patient states she was diagnosed with COVID on 3/24/21.  Treatment usually is different case by case, data suggest that these might work:   Remdisivir:  5 day course , ALT < 5X ULN  Steroid: For hypoxic patients on supplemental O2 of intubated. dexamethasone 6mg PO or IV Q-day x 10 days (equivalent to solumedrol 32mg IV or Prednisone 40mg)  Anticoagulation:  with prophylactic dosing, full dose to be considered in patients with increased risk for thromboembolic complications. Bleeding can happen but acceptable in high risk patients due to hypercoagulable state.  LMWH is good, for high risk patients consider discharge on oral anticoagulation with rivaroxaban (Xarelto) 10mg PO QD or Eliquis (apixaban) 2.5-5mg PO BID  x 4 weeks.  Currently data and recommendations for COVID-19 treatment are rapidly changing, so this treatment plan is based on my clinical judgement with available information.     COVID 19   - BMP, CBC w diff, NLR. Procalcitonin, Ferritin, CRP, LDH and D dimer for the start and periodically can be repeated.   - CXR with bilateral opacities more consistent with viral pneumonia   - Completed 5days of Remdesivir   - Continue Dexamethasone 6mg po daily for 10days   - No antibiotics at this time.  - Prophylactic anticoagulation due to hypercoagulable state    Will sign off please call with any question.     Dr. Rene Miller   Infectious Diseases   Please call [phone] between 8am and 6pm, call [phone] after 6pm or weekends.     
INTERVAL HPI/OVERNIGHT EVENTS:  Patient seen and examined at bedside. States she feels well. Still with productive cough, states it is easier to bring up sputum. States she does not need O2 while sitting, but feels she needs O2 while ambulating.    MEDICATIONS  (STANDING):  dexAMETHasone     Tablet 6 milliGRAM(s) Oral daily  dextrose 40% Gel 15 Gram(s) Oral once  dextrose 5%. 1000 milliLiter(s) (50 mL/Hr) IV Continuous <Continuous>  dextrose 5%. 1000 milliLiter(s) (100 mL/Hr) IV Continuous <Continuous>  dextrose 50% Injectable 25 Gram(s) IV Push once  dextrose 50% Injectable 12.5 Gram(s) IV Push once  dextrose 50% Injectable 25 Gram(s) IV Push once  enoxaparin Injectable 40 milliGRAM(s) SubCutaneous daily  ferrous    sulfate 325 milliGRAM(s) Oral daily  glucagon  Injectable 1 milliGRAM(s) IntraMuscular once  guaiFENesin  milliGRAM(s) Oral every 12 hours  influenza   Vaccine 0.5 milliLiter(s) IntraMuscular once  insulin lispro (ADMELOG) corrective regimen sliding scale   SubCutaneous Before meals and at bedtime  lisinopril 20 milliGRAM(s) Oral daily  NIFEdipine XL 30 milliGRAM(s) Oral daily  remdesivir  IVPB 100 milliGRAM(s) IV Intermittent every 24 hours    MEDICATIONS  (PRN):  acetaminophen   Tablet .. 650 milliGRAM(s) Oral every 6 hours PRN Temp greater or equal to 38C (100.4F), Mild Pain (1 - 3)  benzonatate 100 milliGRAM(s) Oral every 8 hours PRN Cough      Allergies    No Known Allergies    Intolerances      ROS:  CONSTITUTIONAL: No weakness, fevers or chills  EYES/ENT: No visual changes;  No vertigo or throat pain   NECK: No pain or stiffness  RESPIRATORY: + cough, no wheezing, hemoptysis; + shortness of breath on exertion  CARDIOVASCULAR: No chest pain or palpitations  GASTROINTESTINAL: No abdominal or epigastric pain. No nausea, vomiting, or hematemesis  GENITOURINARY: No dysuria, frequency or hematuria  NEUROLOGICAL: No numbness or weakness  SKIN: No itching, burning, rashes, or lesions   All other review of systems is negative unless indicated above.    Vital Signs Last 24 Hrs  T(C): 36.7 (02 Apr 2021 12:27), Max: 37.3 (02 Apr 2021 05:40)  T(F): 98.1 (02 Apr 2021 12:27), Max: 99.2 (02 Apr 2021 05:40)  HR: 81 (02 Apr 2021 12:27) (81 - 96)  BP: 107/72 (02 Apr 2021 12:27) (102/69 - 131/81)  BP(mean): --  RR: 18 (02 Apr 2021 05:40) (18 - 18)  SpO2: 94% (02 Apr 2021 12:27) (92% - 95%)    Physical Exam:  General: WN/WD NAD  Neurology: A&Ox3, nonfocal, HEARN x 4  Respiratory: improved air entry B/L, +crackles B/L bases  CV: RRR, S1S2  Abdominal: Soft, NT, ND +BS  Extremities: No edema, + peripheral pulses      LABS:                        10.2   5.85  )-----------( 288      ( 02 Apr 2021 09:36 )             33.0     04-02    138  |  105  |  12  ----------------------------<  129<H>  4.1   |  27  |  0.67    Ca    8.2<L>      02 Apr 2021 09:36  Phos  2.4     04-02  Mg     2.2     04-02    TPro  7.0  /  Alb  2.5<L>  /  TBili  0.3  /  DBili  .10  /  AST  63<H>  /  ALT  79<H>  /  AlkPhos  77  04-02    PT/INR - ( 02 Apr 2021 09:36 )   PT: 13.3 sec;   INR: 1.14 ratio               RADIOLOGY & ADDITIONAL TESTS:
INTERVAL HPI/OVERNIGHT EVENTS:  Patient seen and examined at bedside. States she feels a lot better. States she still has cough with productive sputum, but overall much improved. Patient off O2 since yesterday afternoon, saturating >90%. Ambulated with patient in hallway, patient stated she felt a bit SOB, but improved since admission. O2 sat dropped to 86% while ambulating, but improved to 90% quickly after resting.    MEDICATIONS  (STANDING):  dexAMETHasone     Tablet 6 milliGRAM(s) Oral daily  dextrose 40% Gel 15 Gram(s) Oral once  dextrose 5%. 1000 milliLiter(s) (50 mL/Hr) IV Continuous <Continuous>  dextrose 5%. 1000 milliLiter(s) (100 mL/Hr) IV Continuous <Continuous>  dextrose 50% Injectable 25 Gram(s) IV Push once  dextrose 50% Injectable 12.5 Gram(s) IV Push once  dextrose 50% Injectable 25 Gram(s) IV Push once  enoxaparin Injectable 40 milliGRAM(s) SubCutaneous daily  ferrous    sulfate 325 milliGRAM(s) Oral daily  glucagon  Injectable 1 milliGRAM(s) IntraMuscular once  guaiFENesin  milliGRAM(s) Oral every 12 hours  influenza   Vaccine 0.5 milliLiter(s) IntraMuscular once  insulin lispro (ADMELOG) corrective regimen sliding scale   SubCutaneous Before meals and at bedtime  lisinopril 20 milliGRAM(s) Oral daily  NIFEdipine XL 30 milliGRAM(s) Oral daily  remdesivir  IVPB 100 milliGRAM(s) IV Intermittent every 24 hours    MEDICATIONS  (PRN):  acetaminophen   Tablet .. 650 milliGRAM(s) Oral every 6 hours PRN Temp greater or equal to 38C (100.4F), Mild Pain (1 - 3)  benzonatate 100 milliGRAM(s) Oral every 8 hours PRN Cough      Allergies    No Known Allergies    Intolerances      ROS:  CONSTITUTIONAL: No weakness, fevers or chills  EYES/ENT: No visual changes;  No vertigo or throat pain   NECK: No pain or stiffness  RESPIRATORY: + cough, no wheezing, hemoptysis; + mild shortness of breath on exertion  CARDIOVASCULAR: No chest pain or palpitations  GASTROINTESTINAL: No abdominal or epigastric pain. No nausea, vomiting, or hematemesis  GENITOURINARY: No dysuria, frequency or hematuria  NEUROLOGICAL: No numbness or weakness  SKIN: No itching, burning, rashes, or lesions   All other review of systems is negative unless indicated above.    Vital Signs Last 24 Hrs  T(C): 36.6 (03 Apr 2021 11:42), Max: 36.8 (02 Apr 2021 19:39)  T(F): 97.8 (03 Apr 2021 11:42), Max: 98.2 (02 Apr 2021 19:39)  HR: 67 (03 Apr 2021 11:42) (67 - 78)  BP: 125/85 (03 Apr 2021 11:42) (117/80 - 134/88)  BP(mean): --  RR: 18 (03 Apr 2021 11:42) (17 - 18)  SpO2: 94% (03 Apr 2021 11:42) (90% - 98%)    04-03 @ 07:01  -  04-03 @ 12:31  --------------------------------------------------------  IN: 480 mL / OUT: 0 mL / NET: 480 mL        Physical Exam:  General: WN/WD NAD  Neurology: A&Ox3, nonfocal, HEARN x 4  Respiratory: improved air entry B/L, +mild crackles LLL  CV: RRR, S1S2  Abdominal: Soft, NT, ND +BS  Extremities: No edema, + peripheral pulses      LABS:                        10.0   2.40  )-----------( 315      ( 03 Apr 2021 09:11 )             32.8     04-03    140  |  105  |  13  ----------------------------<  141<H>  4.3   |  30  |  0.68    Ca    8.4<L>      03 Apr 2021 09:11  Phos  2.4     04-02  Mg     2.2     04-02    TPro  6.6  /  Alb  2.4<L>  /  TBili  0.3  /  DBili  <.10  /  AST  39<H>  /  ALT  71  /  AlkPhos  72  04-03    PT/INR - ( 03 Apr 2021 09:11 )   PT: 12.6 sec;   INR: 1.08 ratio               RADIOLOGY & ADDITIONAL TESTS:

## 2021-04-05 NOTE — DISCHARGE NOTE PROVIDER - NSDCMRMEDTOKEN_GEN_ALL_CORE_FT
acetaminophen 325 mg oral tablet: 2 tab(s) orally every 4 hours, As needed, Temp greater or equal to 38.5C (101.3F)  dexamethasone 6 mg oral tablet: 1 tab(s) orally once a day  enoxaparin: 40 milligram(s) subcutaneous 2 times a day  ferrous sulfate 325 mg (65 mg elemental iron) oral tablet: 1 tab(s) orally once a day  lisinopril 20 mg oral tablet: 1 tab(s) orally once a day  metFORMIN 500 mg oral tablet: 1 tab(s) orally once a day  Procardia XL 30 mg oral tablet, extended release: 1 tab(s) orally once a day  remdesivir 5 mg/mL intravenous solution: 100 milligram(s) intravenous once a day x 4 days   benzonatate 100 mg oral capsule: 1 cap(s) orally every 8 hours, As needed, Cough  dexamethasone 6 mg oral tablet: 1 tab(s) orally once a day   ferrous sulfate 325 mg (65 mg elemental iron) oral tablet: 1 tab(s) orally once a day  guaiFENesin 600 mg oral tablet, extended release: 1 tab(s) orally every 12 hours  lisinopril 20 mg oral tablet: 1 tab(s) orally once a day  metFORMIN 500 mg oral tablet: 1 tab(s) orally once a day  Procardia XL 30 mg oral tablet, extended release: 1 tab(s) orally once a day  Xarelto 10 mg oral tablet: 1 tab(s) orally once a day    benzonatate 100 mg oral capsule: 1 cap(s) orally every 8 hours, As needed, Cough  dexamethasone 6 mg oral tablet: 1 tab(s) orally once a day   ferrous sulfate 325 mg (65 mg elemental iron) oral tablet: 1 tab(s) orally once a day  lisinopril 20 mg oral tablet: 1 tab(s) orally once a day  metFORMIN 500 mg oral tablet: 1 tab(s) orally once a day  Procardia XL 30 mg oral tablet, extended release: 1 tab(s) orally once a day  Xarelto 10 mg oral tablet: 1 tab(s) orally once a day

## 2021-12-26 NOTE — ED ADULT TRIAGE NOTE - ISOLATION PROVIDED EDUCATION
Krystal Guevara is a 42 year old female presenting with right eye - patient did hit eye 1 week prior on a counter top and now swelling noted to this eye.     Visual acuity:    Right eye : 20/40  Left eye:    20/50      Medications verified with patients assistance  ALLERGIES:   Allergen Reactions   • Cranberry HIVES     No PCP  Weight with  shoes    Pharmacy verified    Patient would like communication of their results via:        Cell Phone:   Telephone Information:   Mobile [phone]     Okay to leave a message containing results? Yes      Patient/Family

## 2022-04-29 ENCOUNTER — EMERGENCY (EMERGENCY)
Facility: HOSPITAL | Age: 42
LOS: 0 days | Discharge: LEFT BEFORE DISCHARGE | End: 2022-04-29
Attending: STUDENT IN AN ORGANIZED HEALTH CARE EDUCATION/TRAINING PROGRAM
Payer: MEDICAID

## 2022-04-29 VITALS
HEART RATE: 87 BPM | RESPIRATION RATE: 16 BRPM | HEIGHT: 65 IN | DIASTOLIC BLOOD PRESSURE: 86 MMHG | OXYGEN SATURATION: 98 % | WEIGHT: 263.89 LBS | TEMPERATURE: 98 F | SYSTOLIC BLOOD PRESSURE: 138 MMHG

## 2022-04-29 VITALS
TEMPERATURE: 98 F | SYSTOLIC BLOOD PRESSURE: 140 MMHG | HEART RATE: 78 BPM | DIASTOLIC BLOOD PRESSURE: 94 MMHG | OXYGEN SATURATION: 99 %

## 2022-04-29 DIAGNOSIS — I10 ESSENTIAL (PRIMARY) HYPERTENSION: ICD-10-CM

## 2022-04-29 DIAGNOSIS — E66.01 MORBID (SEVERE) OBESITY DUE TO EXCESS CALORIES: ICD-10-CM

## 2022-04-29 DIAGNOSIS — R10.84 GENERALIZED ABDOMINAL PAIN: ICD-10-CM

## 2022-04-29 DIAGNOSIS — Z53.29 PROCEDURE AND TREATMENT NOT CARRIED OUT BECAUSE OF PATIENT'S DECISION FOR OTHER REASONS: ICD-10-CM

## 2022-04-29 DIAGNOSIS — R19.00 INTRA-ABDOMINAL AND PELVIC SWELLING, MASS AND LUMP, UNSPECIFIED SITE: ICD-10-CM

## 2022-04-29 DIAGNOSIS — R11.0 NAUSEA: ICD-10-CM

## 2022-04-29 DIAGNOSIS — E11.9 TYPE 2 DIABETES MELLITUS WITHOUT COMPLICATIONS: ICD-10-CM

## 2022-04-29 DIAGNOSIS — E87.6 HYPOKALEMIA: ICD-10-CM

## 2022-04-29 DIAGNOSIS — E11.8 TYPE 2 DIABETES MELLITUS WITH UNSPECIFIED COMPLICATIONS: ICD-10-CM

## 2022-04-29 DIAGNOSIS — K80.20 CALCULUS OF GALLBLADDER WITHOUT CHOLECYSTITIS WITHOUT OBSTRUCTION: ICD-10-CM

## 2022-04-29 DIAGNOSIS — Z79.01 LONG TERM (CURRENT) USE OF ANTICOAGULANTS: ICD-10-CM

## 2022-04-29 DIAGNOSIS — Z98.891 HISTORY OF UTERINE SCAR FROM PREVIOUS SURGERY: Chronic | ICD-10-CM

## 2022-04-29 DIAGNOSIS — Z79.84 LONG TERM (CURRENT) USE OF ORAL HYPOGLYCEMIC DRUGS: ICD-10-CM

## 2022-04-29 LAB
ALBUMIN SERPL ELPH-MCNC: 3.1 G/DL — LOW (ref 3.3–5)
ALP SERPL-CCNC: 71 U/L — SIGNIFICANT CHANGE UP (ref 40–120)
ALT FLD-CCNC: 27 U/L — SIGNIFICANT CHANGE UP (ref 12–78)
ANION GAP SERPL CALC-SCNC: 6 MMOL/L — SIGNIFICANT CHANGE UP (ref 5–17)
APPEARANCE UR: CLEAR — SIGNIFICANT CHANGE UP
AST SERPL-CCNC: 15 U/L — SIGNIFICANT CHANGE UP (ref 15–37)
BACTERIA # UR AUTO: NEGATIVE — SIGNIFICANT CHANGE UP
BASOPHILS # BLD AUTO: 0.03 K/UL — SIGNIFICANT CHANGE UP (ref 0–0.2)
BASOPHILS NFR BLD AUTO: 0.3 % — SIGNIFICANT CHANGE UP (ref 0–2)
BILIRUB SERPL-MCNC: 1.3 MG/DL — HIGH (ref 0.2–1.2)
BILIRUB UR-MCNC: NEGATIVE — SIGNIFICANT CHANGE UP
BUN SERPL-MCNC: 8 MG/DL — SIGNIFICANT CHANGE UP (ref 7–23)
CALCIUM SERPL-MCNC: 8.5 MG/DL — SIGNIFICANT CHANGE UP (ref 8.5–10.1)
CHLORIDE SERPL-SCNC: 104 MMOL/L — SIGNIFICANT CHANGE UP (ref 96–108)
CO2 SERPL-SCNC: 28 MMOL/L — SIGNIFICANT CHANGE UP (ref 22–31)
COLOR SPEC: YELLOW — SIGNIFICANT CHANGE UP
CREAT SERPL-MCNC: 0.7 MG/DL — SIGNIFICANT CHANGE UP (ref 0.5–1.3)
DIFF PNL FLD: ABNORMAL
EGFR: 110 ML/MIN/1.73M2 — SIGNIFICANT CHANGE UP
EOSINOPHIL # BLD AUTO: 0.06 K/UL — SIGNIFICANT CHANGE UP (ref 0–0.5)
EOSINOPHIL NFR BLD AUTO: 0.6 % — SIGNIFICANT CHANGE UP (ref 0–6)
EPI CELLS # UR: SIGNIFICANT CHANGE UP
GLUCOSE SERPL-MCNC: 93 MG/DL — SIGNIFICANT CHANGE UP (ref 70–99)
GLUCOSE UR QL: NEGATIVE MG/DL — SIGNIFICANT CHANGE UP
HCG SERPL-ACNC: <1 MIU/ML — SIGNIFICANT CHANGE UP
HCT VFR BLD CALC: 30.4 % — LOW (ref 34.5–45)
HGB BLD-MCNC: 9.6 G/DL — LOW (ref 11.5–15.5)
IMM GRANULOCYTES NFR BLD AUTO: 0.2 % — SIGNIFICANT CHANGE UP (ref 0–1.5)
KETONES UR-MCNC: NEGATIVE — SIGNIFICANT CHANGE UP
LEUKOCYTE ESTERASE UR-ACNC: NEGATIVE — SIGNIFICANT CHANGE UP
LIDOCAIN IGE QN: 93 U/L — SIGNIFICANT CHANGE UP (ref 73–393)
LYMPHOCYTES # BLD AUTO: 1.31 K/UL — SIGNIFICANT CHANGE UP (ref 1–3.3)
LYMPHOCYTES # BLD AUTO: 12.2 % — LOW (ref 13–44)
MCHC RBC-ENTMCNC: 24.7 PG — LOW (ref 27–34)
MCHC RBC-ENTMCNC: 31.6 G/DL — LOW (ref 32–36)
MCV RBC AUTO: 78.4 FL — LOW (ref 80–100)
MONOCYTES # BLD AUTO: 0.49 K/UL — SIGNIFICANT CHANGE UP (ref 0–0.9)
MONOCYTES NFR BLD AUTO: 4.6 % — SIGNIFICANT CHANGE UP (ref 2–14)
NEUTROPHILS # BLD AUTO: 8.83 K/UL — HIGH (ref 1.8–7.4)
NEUTROPHILS NFR BLD AUTO: 82.1 % — HIGH (ref 43–77)
NITRITE UR-MCNC: NEGATIVE — SIGNIFICANT CHANGE UP
NRBC # BLD: 0 /100 WBCS — SIGNIFICANT CHANGE UP (ref 0–0)
PH UR: 6.5 — SIGNIFICANT CHANGE UP (ref 5–8)
PLATELET # BLD AUTO: 282 K/UL — SIGNIFICANT CHANGE UP (ref 150–400)
POTASSIUM SERPL-MCNC: 2.6 MMOL/L — CRITICAL LOW (ref 3.5–5.3)
POTASSIUM SERPL-SCNC: 2.6 MMOL/L — CRITICAL LOW (ref 3.5–5.3)
PROT SERPL-MCNC: 6.9 GM/DL — SIGNIFICANT CHANGE UP (ref 6–8.3)
PROT UR-MCNC: NEGATIVE MG/DL — SIGNIFICANT CHANGE UP
RBC # BLD: 3.88 M/UL — SIGNIFICANT CHANGE UP (ref 3.8–5.2)
RBC # FLD: 16.8 % — HIGH (ref 10.3–14.5)
RBC CASTS # UR COMP ASSIST: SIGNIFICANT CHANGE UP /HPF (ref 0–4)
SODIUM SERPL-SCNC: 138 MMOL/L — SIGNIFICANT CHANGE UP (ref 135–145)
SP GR SPEC: 1 — LOW (ref 1.01–1.02)
UROBILINOGEN FLD QL: NEGATIVE MG/DL — SIGNIFICANT CHANGE UP
WBC # BLD: 10.74 K/UL — HIGH (ref 3.8–10.5)
WBC # FLD AUTO: 10.74 K/UL — HIGH (ref 3.8–10.5)
WBC UR QL: SIGNIFICANT CHANGE UP

## 2022-04-29 PROCEDURE — 99285 EMERGENCY DEPT VISIT HI MDM: CPT

## 2022-04-29 PROCEDURE — 76705 ECHO EXAM OF ABDOMEN: CPT | Mod: 26

## 2022-04-29 PROCEDURE — 99283 EMERGENCY DEPT VISIT LOW MDM: CPT

## 2022-04-29 PROCEDURE — 74177 CT ABD & PELVIS W/CONTRAST: CPT | Mod: 26,MA

## 2022-04-29 PROCEDURE — 93010 ELECTROCARDIOGRAM REPORT: CPT

## 2022-04-29 RX ORDER — POTASSIUM CHLORIDE 20 MEQ
10 PACKET (EA) ORAL ONCE
Refills: 0 | Status: COMPLETED | OUTPATIENT
Start: 2022-04-29 | End: 2022-04-29

## 2022-04-29 RX ORDER — DEXTROSE 50 % IN WATER 50 %
12.5 SYRINGE (ML) INTRAVENOUS ONCE
Refills: 0 | Status: DISCONTINUED | OUTPATIENT
Start: 2022-04-29 | End: 2022-04-29

## 2022-04-29 RX ORDER — FAMOTIDINE 10 MG/ML
20 INJECTION INTRAVENOUS ONCE
Refills: 0 | Status: COMPLETED | OUTPATIENT
Start: 2022-04-29 | End: 2022-04-29

## 2022-04-29 RX ORDER — OXYCODONE HYDROCHLORIDE 5 MG/1
5 TABLET ORAL EVERY 8 HOURS
Refills: 0 | Status: DISCONTINUED | OUTPATIENT
Start: 2022-04-29 | End: 2022-04-29

## 2022-04-29 RX ORDER — IOHEXOL 300 MG/ML
30 INJECTION, SOLUTION INTRAVENOUS ONCE
Refills: 0 | Status: COMPLETED | OUTPATIENT
Start: 2022-04-29 | End: 2022-04-29

## 2022-04-29 RX ORDER — FERROUS SULFATE 325(65) MG
325 TABLET ORAL DAILY
Refills: 0 | Status: DISCONTINUED | OUTPATIENT
Start: 2022-04-29 | End: 2022-04-29

## 2022-04-29 RX ORDER — INSULIN LISPRO 100/ML
VIAL (ML) SUBCUTANEOUS
Refills: 0 | Status: DISCONTINUED | OUTPATIENT
Start: 2022-04-29 | End: 2022-04-29

## 2022-04-29 RX ORDER — KETOROLAC TROMETHAMINE 30 MG/ML
15 SYRINGE (ML) INJECTION ONCE
Refills: 0 | Status: DISCONTINUED | OUTPATIENT
Start: 2022-04-29 | End: 2022-04-29

## 2022-04-29 RX ORDER — POTASSIUM CHLORIDE 20 MEQ
40 PACKET (EA) ORAL ONCE
Refills: 0 | Status: COMPLETED | OUTPATIENT
Start: 2022-04-29 | End: 2022-04-29

## 2022-04-29 RX ORDER — LANOLIN ALCOHOL/MO/W.PET/CERES
3 CREAM (GRAM) TOPICAL AT BEDTIME
Refills: 0 | Status: DISCONTINUED | OUTPATIENT
Start: 2022-04-29 | End: 2022-04-29

## 2022-04-29 RX ORDER — DEXTROSE 50 % IN WATER 50 %
25 SYRINGE (ML) INTRAVENOUS ONCE
Refills: 0 | Status: DISCONTINUED | OUTPATIENT
Start: 2022-04-29 | End: 2022-04-29

## 2022-04-29 RX ORDER — ENOXAPARIN SODIUM 100 MG/ML
40 INJECTION SUBCUTANEOUS EVERY 12 HOURS
Refills: 0 | Status: DISCONTINUED | OUTPATIENT
Start: 2022-04-29 | End: 2022-04-29

## 2022-04-29 RX ORDER — INSULIN LISPRO 100/ML
VIAL (ML) SUBCUTANEOUS AT BEDTIME
Refills: 0 | Status: DISCONTINUED | OUTPATIENT
Start: 2022-04-29 | End: 2022-04-29

## 2022-04-29 RX ORDER — SODIUM CHLORIDE 9 MG/ML
1000 INJECTION, SOLUTION INTRAVENOUS
Refills: 0 | Status: DISCONTINUED | OUTPATIENT
Start: 2022-04-29 | End: 2022-04-29

## 2022-04-29 RX ORDER — DEXTROSE 50 % IN WATER 50 %
15 SYRINGE (ML) INTRAVENOUS ONCE
Refills: 0 | Status: DISCONTINUED | OUTPATIENT
Start: 2022-04-29 | End: 2022-04-29

## 2022-04-29 RX ORDER — ACETAMINOPHEN 500 MG
650 TABLET ORAL EVERY 6 HOURS
Refills: 0 | Status: DISCONTINUED | OUTPATIENT
Start: 2022-04-29 | End: 2022-04-29

## 2022-04-29 RX ORDER — GLUCAGON INJECTION, SOLUTION 0.5 MG/.1ML
1 INJECTION, SOLUTION SUBCUTANEOUS ONCE
Refills: 0 | Status: DISCONTINUED | OUTPATIENT
Start: 2022-04-29 | End: 2022-04-29

## 2022-04-29 RX ORDER — SODIUM CHLORIDE 9 MG/ML
1000 INJECTION, SOLUTION INTRAVENOUS ONCE
Refills: 0 | Status: COMPLETED | OUTPATIENT
Start: 2022-04-29 | End: 2022-04-29

## 2022-04-29 RX ADMIN — IOHEXOL 30 MILLILITER(S): 300 INJECTION, SOLUTION INTRAVENOUS at 05:52

## 2022-04-29 RX ADMIN — Medication 15 MILLIGRAM(S): at 05:42

## 2022-04-29 RX ADMIN — Medication 40 MILLIEQUIVALENT(S): at 07:17

## 2022-04-29 RX ADMIN — Medication 15 MILLIGRAM(S): at 06:17

## 2022-04-29 RX ADMIN — SODIUM CHLORIDE 1000 MILLILITER(S): 9 INJECTION, SOLUTION INTRAVENOUS at 05:42

## 2022-04-29 RX ADMIN — Medication 100 MILLIEQUIVALENT(S): at 10:09

## 2022-04-29 RX ADMIN — FAMOTIDINE 20 MILLIGRAM(S): 10 INJECTION INTRAVENOUS at 05:43

## 2022-04-29 NOTE — ED PROVIDER NOTE - OBJECTIVE STATEMENT
42F hx obesity, DM, on ozempic, presenting for abdominal pain x ~24hrs, states she woke up with it yesterday, has been persistent since, has been a/w nausea and "bloating" sensation. Tried delbert tea at home with no relief. Denies assoc fevers, vomiting, vag bleeding, vag discharge, rashes, chest pain, sob, flank pain, dysuria, hematuria.

## 2022-04-29 NOTE — ED PROVIDER NOTE - CLINICAL SUMMARY MEDICAL DECISION MAKING FREE TEXT BOX
complains of ab pain 10/10 diffuse x one day a/w "gas" and bloating    +tender, no rebound/guarding present  labs, ct scan. overall low suspicion for surgical pathology

## 2022-04-29 NOTE — CONSULT NOTE ADULT - PROBLEM SELECTOR RECOMMENDATION 9
generalized abd pain, negative Farnsworth's sign. No fever, no N/V/D  Likely due to semaglutide, which is a common site effect   Supportive care  Pain control

## 2022-04-29 NOTE — CONSULT NOTE ADULT - PROBLEM SELECTOR RECOMMENDATION 2
K 2.6  Likely due to decrease oral intake ( on restricted diet to lose weight)  Received 40mEq KCL oral and 10mEq IV  Monitor and replace serum electrolytes

## 2022-04-29 NOTE — CONSULT NOTE ADULT - SUBJECTIVE AND OBJECTIVE BOX
Patient is a 42y old  Female who presents with a chief complaint of abdominal pain diffuse described as cramping which began worsening yesterday. Patient denies hx of cancer, denies any recent n/v/d or fever. States pain has improved since initial presentation. Ct abd shows large right retroperitoneal mass- patient did not endorse knowledge of this to surgical team but is now requesting to be discharged from ER and states she is aware of the mass and being followed at [facility]. US also shows cholecystitis. Pt endorses intentional weight loss of 41lbs over the last 6 months.     Home meds: ozempic, hydrochlorothiazide.     PAST MEDICAL & SURGICAL HISTORY:  Essential hypertension    Diabetes mellitus, type 2    obesity    History of Covid in 2021    History of     right retroperitoneal mass.       Review of Systems:  I have reviewed 9 systems with the patient and the only positive findings were abdominal pain.     MEDICATIONS  (STANDING):  dextrose 5%. 1000 milliLiter(s) (50 mL/Hr) IV Continuous <Continuous>  dextrose 5%. 1000 milliLiter(s) (100 mL/Hr) IV Continuous <Continuous>  dextrose 50% Injectable 25 Gram(s) IV Push once  dextrose 50% Injectable 12.5 Gram(s) IV Push once  dextrose 50% Injectable 25 Gram(s) IV Push once  enoxaparin Injectable 40 milliGRAM(s) SubCutaneous every 12 hours  ferrous    sulfate 325 milliGRAM(s) Oral daily  glucagon  Injectable 1 milliGRAM(s) IntraMuscular once  insulin lispro (ADMELOG) corrective regimen sliding scale   SubCutaneous three times a day before meals  insulin lispro (ADMELOG) corrective regimen sliding scale   SubCutaneous at bedtime    MEDICATIONS  (PRN):  acetaminophen     Tablet .. 650 milliGRAM(s) Oral every 6 hours PRN Mild Pain (1 - 3), Moderate Pain (4 - 6)  aluminum hydroxide/magnesium hydroxide/simethicone Suspension 30 milliLiter(s) Oral every 4 hours PRN Dyspepsia  dextrose Oral Gel 15 Gram(s) Oral once PRN Blood Glucose LESS THAN 70 milliGRAM(s)/deciliter  melatonin 3 milliGRAM(s) Oral at bedtime PRN Insomnia  oxyCODONE    IR 5 milliGRAM(s) Oral every 8 hours PRN Severe Pain (7 - 10)      Allergies    No Known Allergies      SOCIAL HISTORY lives with family denies etoh or illicit drug use or smoking.           FAMILY HISTORY:  Family hx of hypertension (Father)    FHx: diabetes mellitus (Mother)    Family history of CVA (Father)    Vital Signs Last 24 Hrs  T(C): 36.9 (2022 11:38), Max: 36.9 (2022 11:38)  T(F): 98.5 (2022 11:38), Max: 98.5 (2022 11:38)  HR: 78 (2022 11:38) (78 - 87)  BP: 140/94 (2022 11:38) (116/56 - 140/94)  BP(mean): --  RR: 16 (2022 07:03) (16 - 16)  SpO2: 99% (2022 11:38) (98% - 99%)    Physical Exam:  General:  Appears stated age, well-groomed, well-nourished, no distress  Eyes: EOMI, conjunctiva clear  HENT:  WNL, no JVD  Chest:  clear breath sounds  Cardiovascular:  Regular rate & rhythm  Abdomen: soft mildly tender not distended +bsx4  Extremities:  no pedal edema or calf tenderness noted  Skin:  No rash  Musculoskeletal:  normal strength  Neuro/Psych:  Alert, oriented to time, place and person     LABS:                        9.6    10.74 )-----------( 282      ( 2022 06:16 )             30.4     04-29    138  |  104  |  8   ----------------------------<  93  2.6<LL>   |  28  |  0.70    Ca    8.5      2022 06:16    TPro  6.9  /  Alb  3.1<L>  /  TBili  1.3<H>  /  DBili  x   /  AST  15  /  ALT  27  /  AlkPhos  71  04-29      Urinalysis Basic - ( 2022 08:10 )    Color: Yellow / Appearance: Clear / S.005 / pH: x  Gluc: x / Ketone: Negative  / Bili: Negative / Urobili: Negative mg/dL   Blood: x / Protein: Negative mg/dL / Nitrite: Negative   Leuk Esterase: Negative / RBC: 0-2 /HPF / WBC 0-2   Sq Epi: x / Non Sq Epi: Occasional / Bacteria: Negative        RADIOLOGY & ADDITIONAL STUDIES:  < from: US Gallbladder (US Gallbladder .) (22 @ 08:58) >  IMPRESSION:    Cholelithiasis with multiple gallstones. Gallbladder wall measures at   upper limits of normal. Sonographic Farnsworth sign could not be assessed on   this patient received pain medication.    Common bile duct is mildly dilated, measuring 0.7 cm.    Hyperechoic left liver mass, with peripheral nodular enhancement on   abdominal CT, possibly hemangioma.      < end of copied text >    < from: CT Abdomen and Pelvis w/ Oral Cont and w/ IV Cont (22 @ 07:29) >    IMPRESSION:  There is a prominently fatty lesion in the right retroperitoneum which   measures 9.0 x 6.3 x 9.2 cm with subtle internal stranding, which   surrounds the right inferior iliopsoas muscle and causes mild mass effect   on the cecum. 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    Indeterminant 4.7 cm lesion in the left hepatic lobe should also be   further evaluated with MRI.    Additional findings as above.      < end of copied text >      Impression/Plan:  42 year old female with hypokalemia, abdominal pain, cholelithiasis and 9.0x6.3x9.2cm right retroperitoneal mass    recommend surgical oncology follow up  no general surgery intervention for right retroperitoneal mass  consider transfer to [facility] if warranted for surgical oncology evaluation  offered medical admission for hypokalemia however it appears patient may leave ama.   discussed with Dr. Wade and Dr. Salvador
42 years old female with h/o HTN, DM on Ozempic, morbid obesity present to ED with abd pain for 1 day. Pain is generalized, constant, bloading/cramping pain, 9/10 intensity. No fever, chill, N/V/D.   Hemodynamically stable, afebrile, sat well at [facility]. EKG with NSR, VR 75, QTc 417. WBC 10.74, Plt 282, Na 138, K 2.6, Cr 0.7, lipase 93, hCG negative. UA not suggestive of infection. CT abd/pelvis with a prominently fatty lesion in the right retroperitoneum which measures 9.0 x 6.3 x 9.2 cm with subtle internal stranding, which surrounds the right inferior iliopsoas muscle and causes mild mass effect on the cecum. Indeterminant 4.7 cm lesion in the left hepatic lobe. Abd ultrasound with Cholelithiasis with multiple gallstones. Gallbladder wall measures at upper limits of normal. Common bile duct is mildly dilated, measuring 0.7 cm. Hyperechoic left liver mass, with peripheral nodular enhancement on abdominal CT, possibly hemangioma.      SH: no toxic habits  FH:  father-HTN, DM, mother-HTN    ROS  All ROS were negative except abd pain    Physical Exam   CONSTITUTIONAL: Well developed, large body habitus, alert and cooperative, no acute distress.  EYES: PERRL,  no scleral icterus  ENT: Mucosa moist, tongue normal.   NECK: Neck supple, trachea midline, non-tender  CARDIAC: Normal S1 and S2. Regular rate and rhythms. No murmurs. No Pedal edema.  LUNGS: Clear to auscultation, equal air entry both lungs. No rales, rhonchi, wheezing. Normal respiratory effort.   ABDOMEN: Very mild generalized tenderness noted, more on epigastric region. Negative Farnsworth signs. No guarding or rebound tenderness. No hepatomegaly or splenomegaly. Bowel sound normal. MUSCULOSKELETAL: Normocephalic, atraumatic. No significant deformity or joint abnormality  NEUROLOGICAL: No gross motor or sensory deficits  SKIN: no lesions or eruptions. Normal turgor  PSYCHIATRIC: A&O x 3, appropriate mood and affect

## 2022-04-29 NOTE — CONSULT NOTE ADULT - PROBLEM SELECTOR RECOMMENDATION 3
CT abd/pelvis with a prominently fatty lesion in the right retroperitoneum which measures 9.0 x 6.3 x 9.2 cm with subtle internal stranding, which surrounds the right inferior iliopsoas muscle and causes mild mass effect on the cecum. Indeterminant 4.7 cm lesion in the left hepatic lobe.  Per patient, she has known mass for at least 5 years. Had CT scan 5 years, 2 years and most recently 01/2022 at [facility], has not changed in size per patient  Oncology consult- Dr Alexis

## 2022-04-29 NOTE — ED PROVIDER NOTE - PROGRESS NOTE DETAILS
Pt was seen and treated by Dr. Dey Pt is going to ct abd/ pelvis now. + diffused tender to palp x 4 K 2.6 pt has had watery nonbloody diarrhea and take hydrochlorothiazide daily. Potassium 10 mq ivpb x 2 are ordered. pt is alert and oriented x 3 capable of making medical decisions pt is given against medical advise Pt sts she has to go home to care for her two children Pt is fully explained the risk of leaving hospital  including death, Pt sts she has been following up with her abdominal mass for the last couple or year at [facility].

## 2022-04-29 NOTE — CONSULT NOTE ADULT - ASSESSMENT
42 years old female with h/o HTN, DM on Ozempic, morbid obesity present to ED with abd pain for 1 day. Pain is generalized, constant, bloading/cramping pain, 9/10 intensity. No fever, chill, N/V/D.   Hemodynamically stable, afebrile, sat well at RA. EKG with NSR, VR 75, QTc 417. WBC 10.74, Plt 282, Na 138, K 2.6, Cr 0.7, lipase 93, hCG negative. UA not suggestive of infection. CT abd/pelvis with a prominently fatty lesion in the right retroperitoneum which measures 9.0 x 6.3 x 9.2 cm with subtle internal stranding, which surrounds the right inferior iliopsoas muscle and causes mild mass effect on the cecum. Indeterminant 4.7 cm lesion in the left hepatic lobe. Abd ultrasound with Cholelithiasis with multiple gallstones. Gallbladder wall measures at upper limits of normal. Common bile duct is mildly dilated, measuring 0.7 cm. Hyperechoic left liver mass, with peripheral nodular enhancement on abdominal CT, possibly hemangioma.              Initially planned for admission but later ED reported that patient wants to leave AMA. Informed oncology Dr zepeda about this. If patient agree to stay, would repeat BMP, Mg in PM as ordered and consult oncology as to any further work up inpatient vs continue outpatient follow up with patient's doctor at [facility]

## 2022-04-29 NOTE — ED PROVIDER NOTE - PHYSICAL EXAMINATION
Constitutional: Well appearing, awake, alert, oriented to person, place, time/situation and in no apparent distress.  ENMT: Airway patent.  Eyes: Clear bilaterally, pupils equal, round and reactive to light.  Cardiac: Normal rate, regular rhythm.  Heart sounds S1, S2.  Respiratory: Breath sounds clear and equal bilaterally.  Gastrointestinal: Diffuse tenderness, no rebound/guarding.   Neurological: Alert and oriented, no focal deficits, no motor or sensory deficits.  Skin: No evidence of rash.

## 2022-04-29 NOTE — ED PROVIDER NOTE - PATIENT PORTAL LINK FT
You can access the FollowMyHealth Patient Portal offered by [facility] by registering at the following website: [email or web address]. By joining [employer]’s FollowMyHealth portal, you will also be able to view your health information using other applications (apps) compatible with our system.

## 2022-05-01 ENCOUNTER — INPATIENT (INPATIENT)
Facility: HOSPITAL | Age: 42
LOS: 4 days | Discharge: HOME | End: 2022-05-06
Attending: INTERNAL MEDICINE | Admitting: INTERNAL MEDICINE
Payer: MEDICAID

## 2022-05-01 VITALS
RESPIRATION RATE: 18 BRPM | WEIGHT: 259.93 LBS | OXYGEN SATURATION: 98 % | DIASTOLIC BLOOD PRESSURE: 74 MMHG | TEMPERATURE: 98 F | HEART RATE: 108 BPM | SYSTOLIC BLOOD PRESSURE: 119 MMHG | HEIGHT: 65 IN

## 2022-05-01 DIAGNOSIS — E87.6 HYPOKALEMIA: ICD-10-CM

## 2022-05-01 DIAGNOSIS — R10.9 UNSPECIFIED ABDOMINAL PAIN: ICD-10-CM

## 2022-05-01 DIAGNOSIS — Z98.891 HISTORY OF UTERINE SCAR FROM PREVIOUS SURGERY: Chronic | ICD-10-CM

## 2022-05-01 DIAGNOSIS — I10 ESSENTIAL (PRIMARY) HYPERTENSION: ICD-10-CM

## 2022-05-01 DIAGNOSIS — K83.8 OTHER SPECIFIED DISEASES OF BILIARY TRACT: ICD-10-CM

## 2022-05-01 DIAGNOSIS — E11.9 TYPE 2 DIABETES MELLITUS WITHOUT COMPLICATIONS: ICD-10-CM

## 2022-05-01 LAB
ALBUMIN SERPL ELPH-MCNC: 3.1 G/DL — LOW (ref 3.3–5)
ALP SERPL-CCNC: 102 U/L — SIGNIFICANT CHANGE UP (ref 40–120)
ALT FLD-CCNC: 29 U/L — SIGNIFICANT CHANGE UP (ref 12–78)
ANION GAP SERPL CALC-SCNC: 11 MMOL/L — SIGNIFICANT CHANGE UP (ref 5–17)
APPEARANCE UR: ABNORMAL
APTT BLD: 30.5 SEC — SIGNIFICANT CHANGE UP (ref 27.5–35.5)
AST SERPL-CCNC: 13 U/L — LOW (ref 15–37)
BACTERIA # UR AUTO: ABNORMAL
BASOPHILS # BLD AUTO: 0 K/UL — SIGNIFICANT CHANGE UP (ref 0–0.2)
BASOPHILS NFR BLD AUTO: 0 % — SIGNIFICANT CHANGE UP (ref 0–2)
BILIRUB SERPL-MCNC: 2.2 MG/DL — HIGH (ref 0.2–1.2)
BILIRUB UR-MCNC: NEGATIVE — SIGNIFICANT CHANGE UP
BLD GP AB SCN SERPL QL: SIGNIFICANT CHANGE UP
BUN SERPL-MCNC: 9 MG/DL — SIGNIFICANT CHANGE UP (ref 7–23)
CALCIUM SERPL-MCNC: 8.9 MG/DL — SIGNIFICANT CHANGE UP (ref 8.5–10.1)
CHLORIDE SERPL-SCNC: 99 MMOL/L — SIGNIFICANT CHANGE UP (ref 96–108)
CO2 SERPL-SCNC: 27 MMOL/L — SIGNIFICANT CHANGE UP (ref 22–31)
COLOR SPEC: ABNORMAL
CREAT SERPL-MCNC: 0.88 MG/DL — SIGNIFICANT CHANGE UP (ref 0.5–1.3)
DIFF PNL FLD: ABNORMAL
EGFR: 84 ML/MIN/1.73M2 — SIGNIFICANT CHANGE UP
EOSINOPHIL # BLD AUTO: 0 K/UL — SIGNIFICANT CHANGE UP (ref 0–0.5)
EOSINOPHIL NFR BLD AUTO: 0 % — SIGNIFICANT CHANGE UP (ref 0–6)
EPI CELLS # UR: ABNORMAL
FLUAV AG NPH QL: SIGNIFICANT CHANGE UP
FLUBV AG NPH QL: SIGNIFICANT CHANGE UP
GLUCOSE BLDC GLUCOMTR-MCNC: 117 MG/DL — HIGH (ref 70–99)
GLUCOSE SERPL-MCNC: 117 MG/DL — HIGH (ref 70–99)
GLUCOSE UR QL: NEGATIVE MG/DL — SIGNIFICANT CHANGE UP
HCG SERPL-ACNC: <1 MIU/ML — SIGNIFICANT CHANGE UP
HCG SERPL-ACNC: <1 MIU/ML — SIGNIFICANT CHANGE UP
HCT VFR BLD CALC: 35.4 % — SIGNIFICANT CHANGE UP (ref 34.5–45)
HGB BLD-MCNC: 10.9 G/DL — LOW (ref 11.5–15.5)
INR BLD: 1.33 RATIO — HIGH (ref 0.88–1.16)
KETONES UR-MCNC: ABNORMAL
LACTATE SERPL-SCNC: 0.9 MMOL/L — SIGNIFICANT CHANGE UP (ref 0.7–2)
LEUKOCYTE ESTERASE UR-ACNC: ABNORMAL
LYMPHOCYTES # BLD AUTO: 1.04 K/UL — SIGNIFICANT CHANGE UP (ref 1–3.3)
LYMPHOCYTES # BLD AUTO: 4 % — LOW (ref 13–44)
MAGNESIUM SERPL-MCNC: 2.1 MG/DL — SIGNIFICANT CHANGE UP (ref 1.6–2.6)
MANUAL SMEAR VERIFICATION: YES — SIGNIFICANT CHANGE UP
MCHC RBC-ENTMCNC: 24.4 PG — LOW (ref 27–34)
MCHC RBC-ENTMCNC: 30.8 G/DL — LOW (ref 32–36)
MCV RBC AUTO: 79.4 FL — LOW (ref 80–100)
MONOCYTES # BLD AUTO: 1.3 K/UL — HIGH (ref 0–0.9)
MONOCYTES NFR BLD AUTO: 5 % — SIGNIFICANT CHANGE UP (ref 2–14)
NEUTROPHILS # BLD AUTO: 23.21 K/UL — HIGH (ref 1.8–7.4)
NEUTROPHILS NFR BLD AUTO: 83 % — HIGH (ref 43–77)
NEUTS BAND # BLD: 6 % — SIGNIFICANT CHANGE UP (ref 0–8)
NEUTS VAC BLD QL SMEAR: SLIGHT — SIGNIFICANT CHANGE UP
NITRITE UR-MCNC: NEGATIVE — SIGNIFICANT CHANGE UP
NRBC # BLD: 0 /100 — SIGNIFICANT CHANGE UP (ref 0–0)
NRBC # BLD: SIGNIFICANT CHANGE UP /100 WBCS (ref 0–0)
PH UR: 6 — SIGNIFICANT CHANGE UP (ref 5–8)
PLAT MORPH BLD: NORMAL — SIGNIFICANT CHANGE UP
PLATELET # BLD AUTO: 370 K/UL — SIGNIFICANT CHANGE UP (ref 150–400)
POTASSIUM SERPL-MCNC: 2.8 MMOL/L — CRITICAL LOW (ref 3.5–5.3)
POTASSIUM SERPL-SCNC: 2.8 MMOL/L — CRITICAL LOW (ref 3.5–5.3)
PROT SERPL-MCNC: 7.8 GM/DL — SIGNIFICANT CHANGE UP (ref 6–8.3)
PROT UR-MCNC: 100 MG/DL
PROTHROM AB SERPL-ACNC: 16 SEC — HIGH (ref 10.5–13.4)
RBC # BLD: 4.46 M/UL — SIGNIFICANT CHANGE UP (ref 3.8–5.2)
RBC # FLD: 17 % — HIGH (ref 10.3–14.5)
RBC BLD AUTO: SIGNIFICANT CHANGE UP
RBC CASTS # UR COMP ASSIST: ABNORMAL /HPF (ref 0–4)
SARS-COV-2 RNA SPEC QL NAA+PROBE: SIGNIFICANT CHANGE UP
SODIUM SERPL-SCNC: 137 MMOL/L — SIGNIFICANT CHANGE UP (ref 135–145)
SP GR SPEC: 1.02 — SIGNIFICANT CHANGE UP (ref 1.01–1.02)
TOXIC GRANULES BLD QL SMEAR: PRESENT — SIGNIFICANT CHANGE UP
UROBILINOGEN FLD QL: NEGATIVE MG/DL — SIGNIFICANT CHANGE UP
VARIANT LYMPHS # BLD: 2 % — SIGNIFICANT CHANGE UP (ref 0–6)
WBC # BLD: 26.08 K/UL — HIGH (ref 3.8–10.5)
WBC # FLD AUTO: 26.08 K/UL — HIGH (ref 3.8–10.5)
WBC UR QL: SIGNIFICANT CHANGE UP

## 2022-05-01 PROCEDURE — 76705 ECHO EXAM OF ABDOMEN: CPT | Mod: 26

## 2022-05-01 PROCEDURE — 99221 1ST HOSP IP/OBS SF/LOW 40: CPT

## 2022-05-01 PROCEDURE — 93010 ELECTROCARDIOGRAM REPORT: CPT

## 2022-05-01 PROCEDURE — 99285 EMERGENCY DEPT VISIT HI MDM: CPT

## 2022-05-01 RX ORDER — MORPHINE SULFATE 50 MG/1
4 CAPSULE, EXTENDED RELEASE ORAL ONCE
Refills: 0 | Status: DISCONTINUED | OUTPATIENT
Start: 2022-05-01 | End: 2022-05-01

## 2022-05-01 RX ORDER — POTASSIUM CHLORIDE 20 MEQ
40 PACKET (EA) ORAL ONCE
Refills: 0 | Status: COMPLETED | OUTPATIENT
Start: 2022-05-01 | End: 2022-05-01

## 2022-05-01 RX ORDER — DEXTROSE 50 % IN WATER 50 %
25 SYRINGE (ML) INTRAVENOUS ONCE
Refills: 0 | Status: DISCONTINUED | OUTPATIENT
Start: 2022-05-01 | End: 2022-05-06

## 2022-05-01 RX ORDER — ACETAMINOPHEN 500 MG
650 TABLET ORAL EVERY 6 HOURS
Refills: 0 | Status: DISCONTINUED | OUTPATIENT
Start: 2022-05-01 | End: 2022-05-06

## 2022-05-01 RX ORDER — MORPHINE SULFATE 50 MG/1
4 CAPSULE, EXTENDED RELEASE ORAL EVERY 6 HOURS
Refills: 0 | Status: DISCONTINUED | OUTPATIENT
Start: 2022-05-01 | End: 2022-05-06

## 2022-05-01 RX ORDER — ACETAMINOPHEN 500 MG
650 TABLET ORAL ONCE
Refills: 0 | Status: COMPLETED | OUTPATIENT
Start: 2022-05-01 | End: 2022-05-01

## 2022-05-01 RX ORDER — PIPERACILLIN AND TAZOBACTAM 4; .5 G/20ML; G/20ML
3.38 INJECTION, POWDER, LYOPHILIZED, FOR SOLUTION INTRAVENOUS EVERY 8 HOURS
Refills: 0 | Status: DISCONTINUED | OUTPATIENT
Start: 2022-05-01 | End: 2022-05-06

## 2022-05-01 RX ORDER — SODIUM CHLORIDE 9 MG/ML
1000 INJECTION, SOLUTION INTRAVENOUS
Refills: 0 | Status: DISCONTINUED | OUTPATIENT
Start: 2022-05-01 | End: 2022-05-06

## 2022-05-01 RX ORDER — MORPHINE SULFATE 50 MG/1
2 CAPSULE, EXTENDED RELEASE ORAL EVERY 4 HOURS
Refills: 0 | Status: DISCONTINUED | OUTPATIENT
Start: 2022-05-01 | End: 2022-05-06

## 2022-05-01 RX ORDER — DEXTROSE 50 % IN WATER 50 %
15 SYRINGE (ML) INTRAVENOUS ONCE
Refills: 0 | Status: DISCONTINUED | OUTPATIENT
Start: 2022-05-01 | End: 2022-05-06

## 2022-05-01 RX ORDER — PIPERACILLIN AND TAZOBACTAM 4; .5 G/20ML; G/20ML
3.38 INJECTION, POWDER, LYOPHILIZED, FOR SOLUTION INTRAVENOUS ONCE
Refills: 0 | Status: COMPLETED | OUTPATIENT
Start: 2022-05-01 | End: 2022-05-01

## 2022-05-01 RX ORDER — PANTOPRAZOLE SODIUM 20 MG/1
40 TABLET, DELAYED RELEASE ORAL ONCE
Refills: 0 | Status: COMPLETED | OUTPATIENT
Start: 2022-05-01 | End: 2022-05-01

## 2022-05-01 RX ORDER — INSULIN LISPRO 100/ML
VIAL (ML) SUBCUTANEOUS AT BEDTIME
Refills: 0 | Status: DISCONTINUED | OUTPATIENT
Start: 2022-05-01 | End: 2022-05-06

## 2022-05-01 RX ORDER — SODIUM CHLORIDE 9 MG/ML
2000 INJECTION, SOLUTION INTRAVENOUS ONCE
Refills: 0 | Status: COMPLETED | OUTPATIENT
Start: 2022-05-01 | End: 2022-05-01

## 2022-05-01 RX ORDER — DEXTROSE MONOHYDRATE, SODIUM CHLORIDE, AND POTASSIUM CHLORIDE 50; .745; 4.5 G/1000ML; G/1000ML; G/1000ML
1000 INJECTION, SOLUTION INTRAVENOUS
Refills: 0 | Status: DISCONTINUED | OUTPATIENT
Start: 2022-05-01 | End: 2022-05-06

## 2022-05-01 RX ORDER — KETOROLAC TROMETHAMINE 30 MG/ML
15 SYRINGE (ML) INJECTION ONCE
Refills: 0 | Status: DISCONTINUED | OUTPATIENT
Start: 2022-05-01 | End: 2022-05-01

## 2022-05-01 RX ORDER — POTASSIUM CHLORIDE 20 MEQ
10 PACKET (EA) ORAL
Refills: 0 | Status: COMPLETED | OUTPATIENT
Start: 2022-05-01 | End: 2022-05-01

## 2022-05-01 RX ORDER — SODIUM CHLORIDE 9 MG/ML
1000 INJECTION, SOLUTION INTRAVENOUS ONCE
Refills: 0 | Status: COMPLETED | OUTPATIENT
Start: 2022-05-01 | End: 2022-05-01

## 2022-05-01 RX ORDER — HEPARIN SODIUM 5000 [USP'U]/ML
5000 INJECTION INTRAVENOUS; SUBCUTANEOUS EVERY 8 HOURS
Refills: 0 | Status: DISCONTINUED | OUTPATIENT
Start: 2022-05-01 | End: 2022-05-06

## 2022-05-01 RX ORDER — ONDANSETRON 8 MG/1
4 TABLET, FILM COATED ORAL EVERY 6 HOURS
Refills: 0 | Status: DISCONTINUED | OUTPATIENT
Start: 2022-05-01 | End: 2022-05-06

## 2022-05-01 RX ORDER — DEXTROSE 50 % IN WATER 50 %
12.5 SYRINGE (ML) INTRAVENOUS ONCE
Refills: 0 | Status: DISCONTINUED | OUTPATIENT
Start: 2022-05-01 | End: 2022-05-06

## 2022-05-01 RX ORDER — GLUCAGON INJECTION, SOLUTION 0.5 MG/.1ML
1 INJECTION, SOLUTION SUBCUTANEOUS ONCE
Refills: 0 | Status: DISCONTINUED | OUTPATIENT
Start: 2022-05-01 | End: 2022-05-06

## 2022-05-01 RX ADMIN — MORPHINE SULFATE 4 MILLIGRAM(S): 50 CAPSULE, EXTENDED RELEASE ORAL at 15:18

## 2022-05-01 RX ADMIN — SODIUM CHLORIDE 1000 MILLILITER(S): 9 INJECTION, SOLUTION INTRAVENOUS at 19:36

## 2022-05-01 RX ADMIN — Medication 650 MILLIGRAM(S): at 23:28

## 2022-05-01 RX ADMIN — MORPHINE SULFATE 4 MILLIGRAM(S): 50 CAPSULE, EXTENDED RELEASE ORAL at 18:19

## 2022-05-01 RX ADMIN — Medication 40 MILLIEQUIVALENT(S): at 15:57

## 2022-05-01 RX ADMIN — Medication 650 MILLIGRAM(S): at 22:24

## 2022-05-01 RX ADMIN — Medication 100 MILLIEQUIVALENT(S): at 17:29

## 2022-05-01 RX ADMIN — Medication 100 MILLIEQUIVALENT(S): at 18:33

## 2022-05-01 RX ADMIN — Medication 15 MILLIGRAM(S): at 15:19

## 2022-05-01 RX ADMIN — PIPERACILLIN AND TAZOBACTAM 3.38 GRAM(S): 4; .5 INJECTION, POWDER, LYOPHILIZED, FOR SOLUTION INTRAVENOUS at 18:19

## 2022-05-01 RX ADMIN — Medication 10 MILLIEQUIVALENT(S): at 18:34

## 2022-05-01 RX ADMIN — PIPERACILLIN AND TAZOBACTAM 200 GRAM(S): 4; .5 INJECTION, POWDER, LYOPHILIZED, FOR SOLUTION INTRAVENOUS at 15:57

## 2022-05-01 RX ADMIN — PIPERACILLIN AND TAZOBACTAM 25 GRAM(S): 4; .5 INJECTION, POWDER, LYOPHILIZED, FOR SOLUTION INTRAVENOUS at 23:20

## 2022-05-01 RX ADMIN — PANTOPRAZOLE SODIUM 40 MILLIGRAM(S): 20 TABLET, DELAYED RELEASE ORAL at 15:20

## 2022-05-01 RX ADMIN — Medication 15 MILLIGRAM(S): at 18:19

## 2022-05-01 RX ADMIN — SODIUM CHLORIDE 2000 MILLILITER(S): 9 INJECTION, SOLUTION INTRAVENOUS at 16:02

## 2022-05-01 RX ADMIN — HEPARIN SODIUM 5000 UNIT(S): 5000 INJECTION INTRAVENOUS; SUBCUTANEOUS at 23:23

## 2022-05-01 NOTE — ED PROVIDER NOTE - RESPIRATORY NEGATIVE STATEMENT, MLM
no chest pain, no cough, and no shortness of breath.
Dewayne Morin)  Urology  [address] [place] [address]  Phone: [phone]  Fax: [phone]  Follow Up Time: 1-3 Days

## 2022-05-01 NOTE — CONSULT NOTE ADULT - SUBJECTIVE AND OBJECTIVE BOX
full  consult dictated    Plan:  Pt admitted with abd discomfort and diarrhea x 4 days.  Also with elevated WBC.. +probable acute colitis.  Pt to be started on clear liquids and IV zosyn. Repeat labs in AM. Will follow

## 2022-05-01 NOTE — H&P ADULT - HISTORY OF PRESENT ILLNESS
42 years old female walked in c/o diffused abd cramping and diarrhea 4 to 5 times per day for 4 days and diarrhea is improved since 2 days ago   but  the the cramps continue. Pt had ct of abd/pelvis + mass to the right retroperitoneum mass Pt sts she knows about the mass and had been followed by pmd for 5 years  Pt also had hx of abd cholelithiasis but no acute cholecystitis. Pt had low postassium 2.6 two days ago. Pt denies headache, dizziness, blurred visions light sensitivities, focal/distal weakness or numbness, neck/back/hips pain, cough, sob, chest pain, nausea, vomiting, fever, chills, dysuria or vaginal spotting pt sts the is not at risk of beign pregnant. Pt had hcg negative two days ago. states  was  evaluted  at  [facility] 4 months  for  same  symptoms  was  told  it  was  gastroparesis 2/2 dm foud  to  have  dilated  CBD hypokalemia leucocytosis  admitted  for  further w/u  and  RX  Surgical and  GI called  by  Er MD

## 2022-05-01 NOTE — H&P ADULT - NSHPPHYSICALEXAM_GEN_ALL_CORE
PHYSICAL EXAM:    GENERAL: NAD, well-groomed, well-developed  HEAD:  Atraumatic, Normocephalic  EYES: EOMI, PERRLA, conjunctiva and sclera clear  ENMT: No tonsillar erythema, exudates, or enlargement; Moist mucous membranes, , No lesions  NECK: Supple, No JVD, Normal thyroid  NERVOUS SYSTEM:  Alert & Oriented X4, ; Motor Strength 5/5 B/L upper and lower extremities; DTRs 2+ intact and symmetric  CHEST/LUNG: Clear  bilaterally; No rales, rhonchi, wheezing, or rubs  HEART: Regular rate and rhythm; No murmurs, rubs, or gallops  ABDOMEN: Soft, mild  tenderness  rt lower quadrant  no  guarding  no  rebound Nondistended; no  masses Bowel sounds present  EXTREMITIES:  + Peripheral Pulses, No clubbing, cyanosis, or edema  LYMPH: No lymphadenopathy noted   RECTAL: deferred  SKIN: No rashes or lesions

## 2022-05-01 NOTE — CONSULT NOTE ADULT - ASSESSMENT
42F PMH HTN, DM, found to have retroperitoneal mass on last admission, a/w dilated CBD, elevated t bili, hypoK.  Leukocytosis, tachycardia.  -- IV fluid resuscitation  -- antibiotics  -- NPO  -- MRCP  -- STAT GI consult   -- replete electrolytes   -- pain meds prn  -- antiemetics prn  -- tele monitoring  -- dvt ppx  -- management of medical issues per primary team   -- discussed with Dr. Mora  42F PMH HTN, DM, found to have retroperitoneal mass on last admission, a/w dilated CBD, elevated t bili, hypoK.  Leukocytosis, tachycardia.  -- IV fluid resuscitation  -- antibiotics  -- NPO  -- MRCP  -- STAT GI consult   -- replete electrolytes   -- pain meds prn  -- antiemetics prn  -- tele monitoring  -- dvt ppx  -- management of medical issues per primary team   -- discussed with Dr. Mora and Dr. Orlando

## 2022-05-01 NOTE — CONSULT NOTE ADULT - SUBJECTIVE AND OBJECTIVE BOX
HPI:   Patient is a 42 year old female with PMH HTN, DM, PSH  x 2 who presents to the ED today c/o 8/10 epigastric pain x 4 days. Admits to associated nausea and anorexia.     PAST MEDICAL & SURGICAL HISTORY:  Essential hypertension    Diabetes mellitus, type 2    Abdominal mass    History of         Review of Systems:    I have reviewed 9 systems with the patient and the only positive findings were     MEDICATIONS  (STANDING):  heparin   Injectable 5000 Unit(s) SubCutaneous every 8 hours  potassium chloride  10 mEq/100 mL IVPB 10 milliEquivalent(s) IV Intermittent every 1 hour    MEDICATIONS  (PRN):      Allergies    No Known Allergies    Intolerances        SOCIAL HISTORY          Smoking: Yes [ ]  No [ ]   ______pk yrs          ETOH  Yes [ ]  No [ ]  Social [ ]          DRUGS:  Yes [ ]  No [ ]  if so what______________    FAMILY HISTORY:  Family hx of hypertension (Father)    FHx: diabetes mellitus (Mother)    Family history of CVA (Father)        Vital Signs Last 24 Hrs  T(C): 36.7 (01 May 2022 12:40), Max: 36.7 (01 May 2022 12:40)  T(F): 98 (01 May 2022 12:40), Max: 98 (01 May 2022 12:40)  HR: 108 (01 May 2022 12:40) (108 - 108)  BP: 119/74 (01 May 2022 12:40) (119/74 - 119/74)  BP(mean): --  RR: 18 (01 May 2022 12:40) (18 - 18)  SpO2: 98% (01 May 2022 12:40) (98% - 98%)    Physical Exam:    General:  Appears stated age, well-groomed, well-nourished, no distress  Eyes : DAVID  HENT:  WNL, no JVD  Chest:  clear breath sounds  Cardiovascular:  Regular rate & rhythm  Abdomen:    Extremities:  Gait & station:    Skin:  No rash  Musculoskeletal:  normal strength  Neuro/Psych:  Alert, oriented tp time, place and person       LABS:                        10.9   26.08 )-----------( 370      ( 01 May 2022 14:50 )             35.4     05-01    137  |  99  |  9   ----------------------------<  117<H>  2.8<LL>   |  27  |  0.88    Ca    8.9      01 May 2022 14:50  Mg     2.1     05-    TPro  7.8  /  Alb  3.1<L>  /  TBili  2.2<H>  /  DBili  x   /  AST  13<L>  /  ALT  29  /  AlkPhos  102  05-          RADIOLOGY & ADDITIONAL STUDIES: HPI:   Patient is a 42 year old female with PMH HTN, DM, PSH  x 2 who presents to the ED today c/o 8/10 non-radiating epigastric pain x 4 days. Admits to associated nausea and anorexia. Also c/o loose stool x 1 day. Was seen in the ED on , but left AMA. Patient returns due to progressively worsening symptoms.   Denies fever, chills, chest pain, palpitations, sob, vomiting,  symptoms.     PAST MEDICAL & SURGICAL HISTORY:  Essential hypertension  Diabetes mellitus, type 2  Abdominal mass  History of       Review of Systems:  Contained within HPI    MEDICATIONS  (STANDING):  heparin   Injectable 5000 Unit(s) SubCutaneous every 8 hours  potassium chloride  10 mEq/100 mL IVPB 10 milliEquivalent(s) IV Intermittent every 1 hour    Allergies  No Known Allergies    SOCIAL HISTORY   Denies cigarette smoking, ETOH use.     FAMILY HISTORY:  Family hx of hypertension (Father)    FHx: diabetes mellitus (Mother)    Family history of CVA (Father)    Vital Signs Last 24 Hrs  T(C): 36.7 (01 May 2022 12:40), Max: 36.7 (01 May 2022 12:40)  T(F): 98 (01 May 2022 12:40), Max: 98 (01 May 2022 12:40)  HR: 108 (01 May 2022 12:40) (108 - 108)  BP: 119/74 (01 May 2022 12:40) (119/74 - 119/74)  RR: 18 (01 May 2022 12:40) (18 - 18)  SpO2: 98% (01 May 2022 12:40) (98% - 98%)    Physical Exam:  General:  Appears stated age, well-groomed, well-nourished, no distress  Eyes: Mild scleral icterus   HENT: WNL, no JVD  Chest: CTA b/l, respirations nonlabored   Cardiovascular: S1S2 tachycardic   Abdomen: +BS throughout, soft, obese, nondistended, ++epigastric tenderness, no guarding, no rigidity   Extremities: No pedal edema b/l, no calf tenderness b/l   Skin: No rash  Neuro/Psych: A&Ox3      LABS:                        10.9   26.08 )-----------( 370      ( 01 May 2022 14:50 )             35.4     05-    137  |  99  |  9   ----------------------------<  117<H>  2.8<LL>   |  27  |  0.88    Ca    8.9      01 May 2022 14:50  Mg     2.1         TPro  7.8  /  Alb  3.1<L>  /  TBili  2.2<H>  /  DBili  x   /  AST  13<L>  /  ALT  29  /  AlkPhos  102        RADIOLOGY & ADDITIONAL STUDIES:  < from: US Abdomen Upper Quadrant Right (22 @ 17:04) >  FINDINGS:  Liver: Normal in size and echogenicity. Again noted is 4.4 cm echogenic   lesion in the left lobe of the liver consistent with hemangioma,   unchanged.  Bile ducts: No intrahepatic biliary dilatation.. Common bile duct is   mildly dilated measuring 9 mm.    Gallbladder: Multiple gallstones. Gallbladder is otherwise unremarkable.   There is no sonographic Farnsworth sign.  Pancreas: Visualized portions are within normal limits.  Right kidney: 11.0 cm. No hydronephrosis.  Ascites: None.  IVC: Visualized portions are within normal limits.    IMPRESSION:  Mildly dilated common bile duct without intrahepatic biliary dilatation.   Consider further evaluation with MRCP.    Left hepatic hemangioma, unchanged.        --- End of Report ---    < end of copied text >   HPI:   Patient is a 42 year old female with PMH HTN, DM, PSH  x 2 who presents to the ED today c/o 8/10, sharp, non-radiating epigastric pain x 4 days. Admits to associated nausea and anorexia. Also c/o loose stool x 1 day. Was seen in the ED on , but left AMA. Patient returns due to progressively worsening symptoms. Denies exacerbating or alleviating factors.   Denies fever, chills, chest pain, palpitations, sob, vomiting,  symptoms.     PAST MEDICAL & SURGICAL HISTORY:  Essential hypertension  Diabetes mellitus, type 2  Abdominal mass  History of       Review of Systems:  Contained within HPI    MEDICATIONS  (STANDING):  heparin   Injectable 5000 Unit(s) SubCutaneous every 8 hours  potassium chloride  10 mEq/100 mL IVPB 10 milliEquivalent(s) IV Intermittent every 1 hour    Allergies  No Known Allergies    SOCIAL HISTORY   Denies cigarette smoking, ETOH use.     FAMILY HISTORY:  Family hx of hypertension (Father)    FHx: diabetes mellitus (Mother)    Family history of CVA (Father)    Vital Signs Last 24 Hrs  T(C): 36.7 (01 May 2022 12:40), Max: 36.7 (01 May 2022 12:40)  T(F): 98 (01 May 2022 12:40), Max: 98 (01 May 2022 12:40)  HR: 108 (01 May 2022 12:40) (108 - 108)  BP: 119/74 (01 May 2022 12:40) (119/74 - 119/74)  RR: 18 (01 May 2022 12:40) (18 - 18)  SpO2: 98% (01 May 2022 12:40) (98% - 98%)    Physical Exam:  General:  Appears stated age, well-groomed, well-nourished, no distress  Eyes: Mild scleral icterus   HENT: WNL, no JVD  Chest: CTA b/l, respirations nonlabored   Cardiovascular: S1S2 tachycardic   Abdomen: +BS throughout, soft, obese, nondistended, ++epigastric tenderness, no guarding, no rigidity   Extremities: No pedal edema b/l, no calf tenderness b/l   Skin: No rash  Neuro/Psych: A&Ox3      LABS:                        10.9   26.08 )-----------( 370      ( 01 May 2022 14:50 )             35.4         137  |  99  |  9   ----------------------------<  117<H>  2.8<LL>   |  27  |  0.88    Ca    8.9      01 May 2022 14:50  Mg     2.1         TPro  7.8  /  Alb  3.1<L>  /  TBili  2.2<H>  /  DBili  x   /  AST  13<L>  /  ALT  29  /  AlkPhos  102        RADIOLOGY & ADDITIONAL STUDIES:  < from: US Abdomen Upper Quadrant Right (22 @ 17:04) >  FINDINGS:  Liver: Normal in size and echogenicity. Again noted is 4.4 cm echogenic   lesion in the left lobe of the liver consistent with hemangioma,   unchanged.  Bile ducts: No intrahepatic biliary dilatation.. Common bile duct is   mildly dilated measuring 9 mm.    Gallbladder: Multiple gallstones. Gallbladder is otherwise unremarkable.   There is no sonographic Farnsworth sign.  Pancreas: Visualized portions are within normal limits.  Right kidney: 11.0 cm. No hydronephrosis.  Ascites: None.  IVC: Visualized portions are within normal limits.    IMPRESSION:  Mildly dilated common bile duct without intrahepatic biliary dilatation.   Consider further evaluation with MRCP.    Left hepatic hemangioma, unchanged.        --- End of Report ---    < end of copied text >

## 2022-05-01 NOTE — ED ADULT NURSE REASSESSMENT NOTE - NS ED NURSE REASSESS COMMENT FT1
Pt is pending MRCP. As per MD [place], MRCP to be completed tonight if possible. As per ED sectery, no MRI tech available tonight. 2D RN made aware.

## 2022-05-01 NOTE — ED PROVIDER NOTE - EYES, MLM
Clear bilaterally, pupils equal, round and reactive to light. Clear bilaterally, pupils equal, round and reactive to light. No sclera iterus.

## 2022-05-01 NOTE — PATIENT PROFILE ADULT - ARE SIGNIFICANT INDICATORS COMPLETE.
Attempting to find fetal heart tones on baby B.  Attempting to find heart tones with doppler.   Baby is transverse and active movement felt by patient.  Doptones of 140 obtained on maternal right upper quadrant.  At 1754 both fetuses' heart tones visible on strip for two minutes before loss of capture on baby B.  Continuing to readjust fetal monitor.   Yes

## 2022-05-01 NOTE — PATIENT PROFILE ADULT - PATIENT REPRESENTATIVE: ( YOU CAN CHOOSE ANY PERSON THAT CAN ASSIST YOU WITH YOUR HEALTH CARE PREFERENCES, DOES NOT HAVE TO BE A SPOUSE, IMMEDIATE FAMILY OR SIGNIFICANT OTHER/PARTNER)
Phillip Mendoza is a 67 y.o. male here for a diabetic eye screening with non-dilated fundus photos per Dr. Melo    Patient cooperative?: Yes  Small pupils?: No  Last eye exam: 2019.    For exam results, see Encounter Report.    
yes

## 2022-05-01 NOTE — ED ADULT NURSE NOTE - OBJECTIVE STATEMENT
p/w upper abd pain since Thursday, no radiation, constant, with nausea and diarrhea, denies urine issues. denies fevers/cp. NAD noted.

## 2022-05-01 NOTE — H&P ADULT - ASSESSMENT
IMPROVE VTE Individual Risk Assessment    RISK                                                                Points    [  ] Previous VTE                                                  3    [  ] Thrombophilia                                               2    [  ] Lower limb paralysis                                      2        (unable to hold up >15 seconds)      [  ] Current Cancer                                              2         (within 6 months)    [1  ] Immobilization > 24 hrs                                1    [  ] ICU/CCU stay > 24 hours                              1    [  ] Age > 60                                                      1    IMPROVE VTE Score _________    IMPROVE Score 0-1: Low Risk, No VTE prophylaxis required for most patients, encourage ambulation.   IMPROVE Score 2-3: At risk, pharmacologic VTE prophylaxis is indicated for most patients (in the absence of a contraindication)  IMPROVE Score > or = 4: High Risk, pharmacologic VTE prophylaxis is indicated for most patients (in the absence of a contraindication)

## 2022-05-01 NOTE — ED PROVIDER NOTE - ABDOMINAL TENDER
No Farnsworth's sign/left upper quadrant/right upper quadrant/left lower quadrant/right lower quadrant/epigastric

## 2022-05-01 NOTE — PATIENT PROFILE ADULT - FALL HARM RISK - HARM RISK INTERVENTIONS

## 2022-05-01 NOTE — H&P ADULT - NSHPLABSRESULTS_GEN_ALL_CORE
LABS:                        10.9   26.08 )-----------( 370      ( 01 May 2022 14:50 )             35.4     05-01    137  |  99  |  9   ----------------------------<  117<H>  2.8<LL>   |  27  |  0.88    Ca    8.9      01 May 2022 14:50  Mg     2.1     05-01    TPro  7.8  /  Alb  3.1<L>  /  TBili  2.2<H>  /  DBili  x   /  AST  13<L>  /  ALT  29  /  AlkPhos  102  05-01

## 2022-05-01 NOTE — ED PROVIDER NOTE - ENMT NEGATIVE STATEMENT, MLM
Pt's home oxycodone taken to pharmacy. Pharmacy put medication in storage until Pharmacy manager can address tomorrow. Receipt slip placed on chart.    Ears: no ear pain and no hearing problems. Nose: no nasal congestion and no nasal drainage. Mouth/Throat: no dysphagia, no hoarseness and no throat pain. Neck: no lumps, no pain, no stiffness and no swollen glands.

## 2022-05-01 NOTE — ED PROVIDER NOTE - OBJECTIVE STATEMENT
42 years old female walked in c/o diffused abd cramp and diarrhea which improved since 2 days ago Pt is here sts the cramp in not completely resolved. Pt had ct of abd/pelvis + mass to the right retroperitoneum mass Pt sts she knows about the mass and had been followed by pmd since 5 years ago. Pt also had us of abd cholelithiasis but no acute cholecystitis. Pt had low postassium 2.6 two days ago. Pt denies headache, dizziness, blurred visions light sensitivities, focal/distal weakness or numbness, neck/back/hips pain, cough, sob, chest pain, nausea, vomiting, fever, chills, dysuria or vaginal spotting pt sts the is not at risk of beign pregnant. Pt had hcg negative two days ago. 42 years old female walked in c/o diffused abd cramp and diarrhea 4 to 5 time per day for 4 days and diarrhea is improved since 2 days ago Pt is here sts the cramp in not completely resolved. Pt had ct of abd/pelvis + mass to the right retroperitoneum mass Pt sts she knows about the mass and had been followed by pmd since 5 years ago. Pt also had us of abd cholelithiasis but no acute cholecystitis. Pt had low postassium 2.6 two days ago. Pt denies headache, dizziness, blurred visions light sensitivities, focal/distal weakness or numbness, neck/back/hips pain, cough, sob, chest pain, nausea, vomiting, fever, chills, dysuria or vaginal spotting pt sts the is not at risk of beign pregnant. Pt had hcg negative two days ago.

## 2022-05-01 NOTE — CONSULT NOTE ADULT - NS ATTEND AMEND GEN_ALL_CORE FT
I saw and examined the patient who was lying in bed, but complaining of pain, primarily in the epigastric region. She does endorse having pain that is worse right after eating. Examination of the abdomen reveals a soft, non-distended abdomen that is non-TTP in all quadrants, no rebound/guarding. Laboratory and imaging findings were reviewed, which reveals leukocytosis, imaging demonstrating cholelithiasis and possible CBD stricture without ductal dilatation. At this time awaiting GI recommendations, although patient will likely benefits from upper GI/ERCP as symptoms are not consistent with biliary disease at this time. No acute surgical intervention. This was discussed with the patient, and all her questions were answered.

## 2022-05-02 LAB
A1C WITH ESTIMATED AVERAGE GLUCOSE RESULT: 5.8 % — HIGH (ref 4–5.6)
ALBUMIN SERPL ELPH-MCNC: 2.6 G/DL — LOW (ref 3.3–5)
ALP SERPL-CCNC: 103 U/L — SIGNIFICANT CHANGE UP (ref 40–120)
ALT FLD-CCNC: 23 U/L — SIGNIFICANT CHANGE UP (ref 12–78)
ANION GAP SERPL CALC-SCNC: 8 MMOL/L — SIGNIFICANT CHANGE UP (ref 5–17)
AST SERPL-CCNC: 18 U/L — SIGNIFICANT CHANGE UP (ref 15–37)
BASOPHILS # BLD AUTO: 0.05 K/UL — SIGNIFICANT CHANGE UP (ref 0–0.2)
BASOPHILS NFR BLD AUTO: 0.2 % — SIGNIFICANT CHANGE UP (ref 0–2)
BILIRUB SERPL-MCNC: 1.8 MG/DL — HIGH (ref 0.2–1.2)
BUN SERPL-MCNC: 9 MG/DL — SIGNIFICANT CHANGE UP (ref 7–23)
CALCIUM SERPL-MCNC: 8.9 MG/DL — SIGNIFICANT CHANGE UP (ref 8.5–10.1)
CHLORIDE SERPL-SCNC: 104 MMOL/L — SIGNIFICANT CHANGE UP (ref 96–108)
CO2 SERPL-SCNC: 27 MMOL/L — SIGNIFICANT CHANGE UP (ref 22–31)
CREAT SERPL-MCNC: 0.92 MG/DL — SIGNIFICANT CHANGE UP (ref 0.5–1.3)
EGFR: 79 ML/MIN/1.73M2 — SIGNIFICANT CHANGE UP
EOSINOPHIL # BLD AUTO: 0.05 K/UL — SIGNIFICANT CHANGE UP (ref 0–0.5)
EOSINOPHIL NFR BLD AUTO: 0.2 % — SIGNIFICANT CHANGE UP (ref 0–6)
ESTIMATED AVERAGE GLUCOSE: 120 MG/DL — HIGH (ref 68–114)
GLUCOSE BLDC GLUCOMTR-MCNC: 104 MG/DL — HIGH (ref 70–99)
GLUCOSE SERPL-MCNC: 133 MG/DL — HIGH (ref 70–99)
HCT VFR BLD CALC: 30.3 % — LOW (ref 34.5–45)
HGB BLD-MCNC: 9.6 G/DL — LOW (ref 11.5–15.5)
IMM GRANULOCYTES NFR BLD AUTO: 0.6 % — SIGNIFICANT CHANGE UP (ref 0–1.5)
LYMPHOCYTES # BLD AUTO: 0.83 K/UL — LOW (ref 1–3.3)
LYMPHOCYTES # BLD AUTO: 3.8 % — LOW (ref 13–44)
MAGNESIUM SERPL-MCNC: 1.8 MG/DL — SIGNIFICANT CHANGE UP (ref 1.6–2.6)
MCHC RBC-ENTMCNC: 24.7 PG — LOW (ref 27–34)
MCHC RBC-ENTMCNC: 31.7 G/DL — LOW (ref 32–36)
MCV RBC AUTO: 78.1 FL — LOW (ref 80–100)
MONOCYTES # BLD AUTO: 0.76 K/UL — SIGNIFICANT CHANGE UP (ref 0–0.9)
MONOCYTES NFR BLD AUTO: 3.5 % — SIGNIFICANT CHANGE UP (ref 2–14)
NEUTROPHILS # BLD AUTO: 19.84 K/UL — HIGH (ref 1.8–7.4)
NEUTROPHILS NFR BLD AUTO: 91.7 % — HIGH (ref 43–77)
NRBC # BLD: 0 /100 WBCS — SIGNIFICANT CHANGE UP (ref 0–0)
PHOSPHATE SERPL-MCNC: 2.4 MG/DL — LOW (ref 2.5–4.5)
PLATELET # BLD AUTO: 285 K/UL — SIGNIFICANT CHANGE UP (ref 150–400)
POTASSIUM SERPL-MCNC: 3.3 MMOL/L — LOW (ref 3.5–5.3)
POTASSIUM SERPL-SCNC: 3.3 MMOL/L — LOW (ref 3.5–5.3)
PROT SERPL-MCNC: 6.7 GM/DL — SIGNIFICANT CHANGE UP (ref 6–8.3)
RBC # BLD: 3.88 M/UL — SIGNIFICANT CHANGE UP (ref 3.8–5.2)
RBC # FLD: 17.1 % — HIGH (ref 10.3–14.5)
SODIUM SERPL-SCNC: 139 MMOL/L — SIGNIFICANT CHANGE UP (ref 135–145)
WBC # BLD: 21.66 K/UL — HIGH (ref 3.8–10.5)
WBC # FLD AUTO: 21.66 K/UL — HIGH (ref 3.8–10.5)

## 2022-05-02 PROCEDURE — 74181 MRI ABDOMEN W/O CONTRAST: CPT | Mod: 26

## 2022-05-02 PROCEDURE — 78226 HEPATOBILIARY SYSTEM IMAGING: CPT | Mod: 26

## 2022-05-02 PROCEDURE — 99232 SBSQ HOSP IP/OBS MODERATE 35: CPT

## 2022-05-02 PROCEDURE — 72195 MRI PELVIS W/O DYE: CPT | Mod: 26

## 2022-05-02 RX ORDER — POTASSIUM PHOSPHATE, MONOBASIC POTASSIUM PHOSPHATE, DIBASIC 236; 224 MG/ML; MG/ML
15 INJECTION, SOLUTION INTRAVENOUS ONCE
Refills: 0 | Status: COMPLETED | OUTPATIENT
Start: 2022-05-02 | End: 2022-05-02

## 2022-05-02 RX ORDER — POTASSIUM CHLORIDE 20 MEQ
10 PACKET (EA) ORAL
Refills: 0 | Status: COMPLETED | OUTPATIENT
Start: 2022-05-02 | End: 2022-05-02

## 2022-05-02 RX ADMIN — Medication 100 MILLIEQUIVALENT(S): at 11:28

## 2022-05-02 RX ADMIN — PIPERACILLIN AND TAZOBACTAM 25 GRAM(S): 4; .5 INJECTION, POWDER, LYOPHILIZED, FOR SOLUTION INTRAVENOUS at 06:31

## 2022-05-02 RX ADMIN — MORPHINE SULFATE 4 MILLIGRAM(S): 50 CAPSULE, EXTENDED RELEASE ORAL at 17:18

## 2022-05-02 RX ADMIN — HEPARIN SODIUM 5000 UNIT(S): 5000 INJECTION INTRAVENOUS; SUBCUTANEOUS at 22:24

## 2022-05-02 RX ADMIN — MORPHINE SULFATE 4 MILLIGRAM(S): 50 CAPSULE, EXTENDED RELEASE ORAL at 23:14

## 2022-05-02 RX ADMIN — Medication 1 PATCH: at 19:58

## 2022-05-02 RX ADMIN — MORPHINE SULFATE 4 MILLIGRAM(S): 50 CAPSULE, EXTENDED RELEASE ORAL at 22:34

## 2022-05-02 RX ADMIN — MORPHINE SULFATE 4 MILLIGRAM(S): 50 CAPSULE, EXTENDED RELEASE ORAL at 18:47

## 2022-05-02 RX ADMIN — POTASSIUM PHOSPHATE, MONOBASIC POTASSIUM PHOSPHATE, DIBASIC 62.5 MILLIMOLE(S): 236; 224 INJECTION, SOLUTION INTRAVENOUS at 22:22

## 2022-05-02 RX ADMIN — PIPERACILLIN AND TAZOBACTAM 25 GRAM(S): 4; .5 INJECTION, POWDER, LYOPHILIZED, FOR SOLUTION INTRAVENOUS at 17:18

## 2022-05-02 RX ADMIN — Medication 100 MILLIEQUIVALENT(S): at 12:43

## 2022-05-02 RX ADMIN — Medication 1 PATCH: at 14:45

## 2022-05-02 RX ADMIN — DEXTROSE MONOHYDRATE, SODIUM CHLORIDE, AND POTASSIUM CHLORIDE 125 MILLILITER(S): 50; .745; 4.5 INJECTION, SOLUTION INTRAVENOUS at 03:13

## 2022-05-02 RX ADMIN — DEXTROSE MONOHYDRATE, SODIUM CHLORIDE, AND POTASSIUM CHLORIDE 125 MILLILITER(S): 50; .745; 4.5 INJECTION, SOLUTION INTRAVENOUS at 10:48

## 2022-05-02 RX ADMIN — Medication 100 MILLIEQUIVALENT(S): at 10:48

## 2022-05-02 RX ADMIN — MORPHINE SULFATE 4 MILLIGRAM(S): 50 CAPSULE, EXTENDED RELEASE ORAL at 08:16

## 2022-05-02 RX ADMIN — MORPHINE SULFATE 4 MILLIGRAM(S): 50 CAPSULE, EXTENDED RELEASE ORAL at 07:44

## 2022-05-02 NOTE — PROGRESS NOTE ADULT - SUBJECTIVE AND OBJECTIVE BOX
INTERVAL HPI/OVERNIGHT EVENTS:    patient  off  floor  for  testing    REVIEW OF SYSTEMS:  CONSTITUTIONAL: reported   no  complaints    n    MEDICATION:  acetaminophen     Tablet .. 650 milliGRAM(s) Oral every 6 hours PRN  cloNIDine Patch 0.1 mG/24Hr(s) 1 patch Transdermal every 7 days  dextrose 5%. 1000 milliLiter(s) IV Continuous <Continuous>  dextrose 5%. 1000 milliLiter(s) IV Continuous <Continuous>  dextrose 50% Injectable 25 Gram(s) IV Push once  dextrose 50% Injectable 12.5 Gram(s) IV Push once  dextrose 50% Injectable 25 Gram(s) IV Push once  dextrose Oral Gel 15 Gram(s) Oral once PRN  glucagon  Injectable 1 milliGRAM(s) IntraMuscular once  heparin   Injectable 5000 Unit(s) SubCutaneous every 8 hours  insulin lispro (ADMELOG) corrective regimen sliding scale   SubCutaneous at bedtime  morphine  - Injectable 2 milliGRAM(s) IV Push every 4 hours PRN  morphine  - Injectable 4 milliGRAM(s) IV Push every 6 hours PRN  ondansetron Injectable 4 milliGRAM(s) IV Push every 6 hours PRN  piperacillin/tazobactam IVPB.. 3.375 Gram(s) IV Intermittent every 8 hours  sodium chloride 0.9% with potassium chloride 20 mEq/L 1000 milliLiter(s) IV Continuous <Continuous>    Vital Signs Last 24 Hrs  T(C): 36.9 (02 May 2022 04:50), Max: 38.7 (01 May 2022 22:12)  T(F): 98.5 (02 May 2022 04:50), Max: 101.7 (01 May 2022 22:12)  HR: 96 (02 May 2022 04:50) (96 - 115)  BP: 117/78 (02 May 2022 04:50) (114/78 - 128/74)  BP(mean): --  RR: 18 (02 May 2022 04:50) (17 - 28)  SpO2: 99% (02 May 2022 04:50) (97% - 100%)    PHYSICAL EXAM:                        9.6    21.66 )-----------( 285      ( 02 May 2022 07:02 )             30.3     05-02    139  |  104  |  9   ----------------------------<  133<H>  3.3<L>   |  27  |  0.92    Ca    8.9      02 May 2022 07:02  Phos  2.4     05-02  Mg     1.8     05-02    TPro  6.7  /  Alb  2.6<L>  /  TBili  1.8<H>  /  DBili  x   /  AST  18  /  ALT  23  /  AlkPhos  103  05-02    PT/INR - ( 01 May 2022 20:17 )   PT: 16.0 sec;   INR: 1.33 ratio         PTT - ( 01 May 2022 20:17 )  PTT:30.5 sec  Urinalysis Basic - ( 01 May 2022 21:43 )    Color: Ivy / Appearance: Slightly Turbid / S.020 / pH: x  Gluc: x / Ketone: Large  / Bili: Negative / Urobili: Negative mg/dL   Blood: x / Protein: 100 mg/dL / Nitrite: Negative   Leuk Esterase: Small / RBC: 3-5 /HPF / WBC 0-2   Sq Epi: x / Non Sq Epi: Moderate / Bacteria: Moderate      CAPILLARY BLOOD GLUCOSE      POCT Blood Glucose.: 117 mg/dL (01 May 2022 22:27)      RADIOLOGY & ADDITIONAL TESTS:    Imaging reports  Personally Reviewed:  [ ] YES  [ ] NO    Consultant(s) Notes Reviewed:  [x ] YES  [ ] NO    Care Discussed with Consultants/Other Providers [x ] YES  [ ] NO  Problem/Plan - 1:  ·  Problem: Abdominal pain.   ·  Plan: morphine  prn.    Problem/Plan - 2:  ·  Problem: Dilated bile duct.   ·  Plan: mrcp  zosyn pain management  MRCP surgical  and  GI evalautions.    Problem/Plan - 3:  ·  Problem: Hypokalemia.   ·  Plan: supplement.    Problem/Plan - 4:  ·  Problem: DM (diabetes mellitus).   ·  Plan: insulin coverage.    Problem/Plan - 5:  ·  Problem: HTN (hypertension).   ·  Plan: clinidine patch.

## 2022-05-02 NOTE — PROGRESS NOTE ADULT - SUBJECTIVE AND OBJECTIVE BOX
Patient seen and examined at bedside.  Patient complaining of abdominal discomfort.  Denies nausea and vomiting.  Denies chest pain, dyspnea, cough.      MEDICATIONS  (STANDING):  cloNIDine Patch 0.1 mG/24Hr(s) 1 patch Transdermal every 7 days  dextrose 5%. 1000 milliLiter(s) (100 mL/Hr) IV Continuous <Continuous>  dextrose 5%. 1000 milliLiter(s) (50 mL/Hr) IV Continuous <Continuous>  dextrose 50% Injectable 25 Gram(s) IV Push once  dextrose 50% Injectable 12.5 Gram(s) IV Push once  dextrose 50% Injectable 25 Gram(s) IV Push once  glucagon  Injectable 1 milliGRAM(s) IntraMuscular once  heparin   Injectable 5000 Unit(s) SubCutaneous every 8 hours  insulin lispro (ADMELOG) corrective regimen sliding scale   SubCutaneous at bedtime  piperacillin/tazobactam IVPB.. 3.375 Gram(s) IV Intermittent every 8 hours  sodium chloride 0.9% with potassium chloride 20 mEq/L 1000 milliLiter(s) (125 mL/Hr) IV Continuous <Continuous>    MEDICATIONS  (PRN):  acetaminophen     Tablet .. 650 milliGRAM(s) Oral every 6 hours PRN Temp greater or equal to 38C (100.4F), Mild Pain (1 - 3), Moderate Pain (4 - 6)  dextrose Oral Gel 15 Gram(s) Oral once PRN Blood Glucose LESS THAN 70 milliGRAM(s)/deciliter  morphine  - Injectable 2 milliGRAM(s) IV Push every 4 hours PRN Moderate Pain (4 - 6)  morphine  - Injectable 4 milliGRAM(s) IV Push every 6 hours PRN Severe Pain (7 - 10)  ondansetron Injectable 4 milliGRAM(s) IV Push every 6 hours PRN Nausea and/or Vomiting      Vital Signs Last 24 Hrs  T(C): 36.9 (02 May 2022 04:50), Max: 38.7 (01 May 2022 22:12)  T(F): 98.5 (02 May 2022 04:50), Max: 101.7 (01 May 2022 22:12)  HR: 96 (02 May 2022 04:50) (96 - 115)  BP: 117/78 (02 May 2022 04:50) (114/78 - 128/74)  RR: 18 (02 May 2022 04:50) (17 - 28)  SpO2: 99% (02 May 2022 04:50) (97% - 100%)    PHYSICAL EXAM:  General: NAD  Neuro:  Alert & oriented x 3  HEENT: NCAT, EOMI, conjunctiva clear  CV: +S1+S2 regular rate and rhythm  Lung: clear to ausculation bilaterally, respirations nonlabored, good inspiratory effort  Abdomen: soft, NTND. Normoactive BS  Extremities: no pedal edema or calf tenderness noted     I&O's Detail      LABS:                        10.9   26.08 )-----------( 370      ( 01 May 2022 14:50 )             35.4     05-    137  |  99  |  9   ----------------------------<  117<H>  2.8<LL>   |  27  |  0.88    Ca    8.9      01 May 2022 14:50  Mg     2.1     05-    TPro  7.8  /  Alb  3.1<L>  /  TBili  2.2<H>  /  DBili  x   /  AST  13<L>  /  ALT  29  /  AlkPhos  102  05-01    PT/INR - ( 01 May 2022 20:17 )   PT: 16.0 sec;   INR: 1.33 ratio         PTT - ( 01 May 2022 20:17 )  PTT:30.5 sec  Urinalysis Basic - ( 01 May 2022 21:43 )    Color: Ivy / Appearance: Slightly Turbid / S.020 / pH: x  Gluc: x / Ketone: Large  / Bili: Negative / Urobili: Negative mg/dL   Blood: x / Protein: 100 mg/dL / Nitrite: Negative   Leuk Esterase: Small / RBC: 3-5 /HPF / WBC 0-2   Sq Epi: x / Non Sq Epi: Moderate / Bacteria: Moderate

## 2022-05-02 NOTE — PROGRESS NOTE ADULT - SUBJECTIVE AND OBJECTIVE BOX
Pt stable - feels a little better  on antibx  WBC trending downwards      MEDICATIONS  (STANDING):  cloNIDine Patch 0.1 mG/24Hr(s) 1 patch Transdermal every 7 days  dextrose 5%. 1000 milliLiter(s) (100 mL/Hr) IV Continuous <Continuous>  dextrose 5%. 1000 milliLiter(s) (50 mL/Hr) IV Continuous <Continuous>  dextrose 50% Injectable 25 Gram(s) IV Push once  dextrose 50% Injectable 12.5 Gram(s) IV Push once  dextrose 50% Injectable 25 Gram(s) IV Push once  glucagon  Injectable 1 milliGRAM(s) IntraMuscular once  heparin   Injectable 5000 Unit(s) SubCutaneous every 8 hours  insulin lispro (ADMELOG) corrective regimen sliding scale   SubCutaneous at bedtime  piperacillin/tazobactam IVPB.. 3.375 Gram(s) IV Intermittent every 8 hours  potassium phosphate IVPB 15 milliMole(s) IV Intermittent once  sodium chloride 0.9% with potassium chloride 20 mEq/L 1000 milliLiter(s) (125 mL/Hr) IV Continuous <Continuous>    MEDICATIONS  (PRN):  acetaminophen     Tablet .. 650 milliGRAM(s) Oral every 6 hours PRN Temp greater or equal to 38C (100.4F), Mild Pain (1 - 3), Moderate Pain (4 - 6)  dextrose Oral Gel 15 Gram(s) Oral once PRN Blood Glucose LESS THAN 70 milliGRAM(s)/deciliter  morphine  - Injectable 2 milliGRAM(s) IV Push every 4 hours PRN Moderate Pain (4 - 6)  morphine  - Injectable 4 milliGRAM(s) IV Push every 6 hours PRN Severe Pain (7 - 10)  ondansetron Injectable 4 milliGRAM(s) IV Push every 6 hours PRN Nausea and/or Vomiting      Allergies    No Known Allergies    Intolerances        Vital Signs Last 24 Hrs  T(C): 36.8 (02 May 2022 10:14), Max: 38.7 (01 May 2022 22:12)  T(F): 98.3 (02 May 2022 10:14), Max: 101.7 (01 May 2022 22:12)  HR: 99 (02 May 2022 10:14) (96 - 115)  BP: 112/81 (02 May 2022 10:14) (112/81 - 128/74)  BP(mean): --  RR: 17 (02 May 2022 10:14) (17 - 28)  SpO2: 97% (02 May 2022 10:14) (97% - 100%)    PHYSICAL EXAM:  General: NAD.  CVS: S1, S2  Chest: air entry bilaterally present  Abd: BS present, soft, non-tender      LABS:                        9.6    21.66 )-----------( 285      ( 02 May 2022 07:02 )             30.3     05-02    139  |  104  |  9   ----------------------------<  133<H>  3.3<L>   |  27  |  0.92    Ca    8.9      02 May 2022 07:02  Phos  2.4     05-02  Mg     1.8     05-02    TPro  6.7  /  Alb  2.6<L>  /  TBili  1.8<H>  /  DBili  x   /  AST  18  /  ALT  23  /  AlkPhos  103  05-02    PT/INR - ( 01 May 2022 20:17 )   PT: 16.0 sec;   INR: 1.33 ratio         PTT - ( 01 May 2022 20:17 )  PTT:30.5 sec    clear liquids  follow CBC  continue IV zosyn

## 2022-05-03 LAB
ALBUMIN SERPL ELPH-MCNC: 2.1 G/DL — LOW (ref 3.3–5)
ALP SERPL-CCNC: 107 U/L — SIGNIFICANT CHANGE UP (ref 40–120)
ALT FLD-CCNC: 22 U/L — SIGNIFICANT CHANGE UP (ref 12–78)
ANION GAP SERPL CALC-SCNC: 7 MMOL/L — SIGNIFICANT CHANGE UP (ref 5–17)
APTT BLD: 27.1 SEC — LOW (ref 27.5–35.5)
AST SERPL-CCNC: 11 U/L — LOW (ref 15–37)
BILIRUB SERPL-MCNC: 0.8 MG/DL — SIGNIFICANT CHANGE UP (ref 0.2–1.2)
BLD GP AB SCN SERPL QL: SIGNIFICANT CHANGE UP
BUN SERPL-MCNC: 8 MG/DL — SIGNIFICANT CHANGE UP (ref 7–23)
CALCIUM SERPL-MCNC: 8.2 MG/DL — LOW (ref 8.5–10.1)
CHLORIDE SERPL-SCNC: 102 MMOL/L — SIGNIFICANT CHANGE UP (ref 96–108)
CO2 SERPL-SCNC: 28 MMOL/L — SIGNIFICANT CHANGE UP (ref 22–31)
CREAT SERPL-MCNC: 0.75 MG/DL — SIGNIFICANT CHANGE UP (ref 0.5–1.3)
EGFR: 101 ML/MIN/1.73M2 — SIGNIFICANT CHANGE UP
GLUCOSE BLDC GLUCOMTR-MCNC: 97 MG/DL — SIGNIFICANT CHANGE UP (ref 70–99)
GLUCOSE SERPL-MCNC: 126 MG/DL — HIGH (ref 70–99)
HCT VFR BLD CALC: 26.3 % — LOW (ref 34.5–45)
HGB BLD-MCNC: 8.2 G/DL — LOW (ref 11.5–15.5)
INR BLD: 1.16 RATIO — SIGNIFICANT CHANGE UP (ref 0.88–1.16)
MAGNESIUM SERPL-MCNC: 2.1 MG/DL — SIGNIFICANT CHANGE UP (ref 1.6–2.6)
MCHC RBC-ENTMCNC: 24.6 PG — LOW (ref 27–34)
MCHC RBC-ENTMCNC: 31.2 G/DL — LOW (ref 32–36)
MCV RBC AUTO: 78.7 FL — LOW (ref 80–100)
NRBC # BLD: 0 /100 WBCS — SIGNIFICANT CHANGE UP (ref 0–0)
PHOSPHATE SERPL-MCNC: 2.9 MG/DL — SIGNIFICANT CHANGE UP (ref 2.5–4.5)
PLATELET # BLD AUTO: 284 K/UL — SIGNIFICANT CHANGE UP (ref 150–400)
POTASSIUM SERPL-MCNC: 2.8 MMOL/L — CRITICAL LOW (ref 3.5–5.3)
POTASSIUM SERPL-SCNC: 2.8 MMOL/L — CRITICAL LOW (ref 3.5–5.3)
PROT SERPL-MCNC: 6.1 GM/DL — SIGNIFICANT CHANGE UP (ref 6–8.3)
PROTHROM AB SERPL-ACNC: 13.8 SEC — HIGH (ref 10.5–13.4)
RBC # BLD: 3.34 M/UL — LOW (ref 3.8–5.2)
RBC # FLD: 16.7 % — HIGH (ref 10.3–14.5)
SODIUM SERPL-SCNC: 137 MMOL/L — SIGNIFICANT CHANGE UP (ref 135–145)
WBC # BLD: 15.32 K/UL — HIGH (ref 3.8–10.5)
WBC # FLD AUTO: 15.32 K/UL — HIGH (ref 3.8–10.5)

## 2022-05-03 PROCEDURE — 99231 SBSQ HOSP IP/OBS SF/LOW 25: CPT

## 2022-05-03 RX ORDER — SUCRALFATE 1 G
1 TABLET ORAL
Refills: 0 | Status: DISCONTINUED | OUTPATIENT
Start: 2022-05-03 | End: 2022-05-06

## 2022-05-03 RX ORDER — POTASSIUM CHLORIDE 20 MEQ
40 PACKET (EA) ORAL EVERY 4 HOURS
Refills: 0 | Status: COMPLETED | OUTPATIENT
Start: 2022-05-03 | End: 2022-05-03

## 2022-05-03 RX ORDER — PANTOPRAZOLE SODIUM 20 MG/1
40 TABLET, DELAYED RELEASE ORAL
Refills: 0 | Status: DISCONTINUED | OUTPATIENT
Start: 2022-05-03 | End: 2022-05-06

## 2022-05-03 RX ADMIN — DEXTROSE MONOHYDRATE, SODIUM CHLORIDE, AND POTASSIUM CHLORIDE 125 MILLILITER(S): 50; .745; 4.5 INJECTION, SOLUTION INTRAVENOUS at 12:24

## 2022-05-03 RX ADMIN — Medication 40 MILLIEQUIVALENT(S): at 10:59

## 2022-05-03 RX ADMIN — PIPERACILLIN AND TAZOBACTAM 25 GRAM(S): 4; .5 INJECTION, POWDER, LYOPHILIZED, FOR SOLUTION INTRAVENOUS at 23:07

## 2022-05-03 RX ADMIN — PANTOPRAZOLE SODIUM 40 MILLIGRAM(S): 20 TABLET, DELAYED RELEASE ORAL at 17:32

## 2022-05-03 RX ADMIN — Medication 1 GRAM(S): at 23:07

## 2022-05-03 RX ADMIN — PIPERACILLIN AND TAZOBACTAM 25 GRAM(S): 4; .5 INJECTION, POWDER, LYOPHILIZED, FOR SOLUTION INTRAVENOUS at 15:15

## 2022-05-03 RX ADMIN — MORPHINE SULFATE 4 MILLIGRAM(S): 50 CAPSULE, EXTENDED RELEASE ORAL at 22:15

## 2022-05-03 RX ADMIN — Medication 650 MILLIGRAM(S): at 17:32

## 2022-05-03 RX ADMIN — Medication 650 MILLIGRAM(S): at 18:44

## 2022-05-03 RX ADMIN — Medication 1 GRAM(S): at 18:53

## 2022-05-03 RX ADMIN — Medication 40 MILLIEQUIVALENT(S): at 17:32

## 2022-05-03 RX ADMIN — MORPHINE SULFATE 4 MILLIGRAM(S): 50 CAPSULE, EXTENDED RELEASE ORAL at 06:52

## 2022-05-03 RX ADMIN — DEXTROSE MONOHYDRATE, SODIUM CHLORIDE, AND POTASSIUM CHLORIDE 125 MILLILITER(S): 50; .745; 4.5 INJECTION, SOLUTION INTRAVENOUS at 08:44

## 2022-05-03 RX ADMIN — DEXTROSE MONOHYDRATE, SODIUM CHLORIDE, AND POTASSIUM CHLORIDE 125 MILLILITER(S): 50; .745; 4.5 INJECTION, SOLUTION INTRAVENOUS at 17:32

## 2022-05-03 RX ADMIN — MORPHINE SULFATE 4 MILLIGRAM(S): 50 CAPSULE, EXTENDED RELEASE ORAL at 21:51

## 2022-05-03 RX ADMIN — PIPERACILLIN AND TAZOBACTAM 25 GRAM(S): 4; .5 INJECTION, POWDER, LYOPHILIZED, FOR SOLUTION INTRAVENOUS at 01:11

## 2022-05-03 RX ADMIN — DEXTROSE MONOHYDRATE, SODIUM CHLORIDE, AND POTASSIUM CHLORIDE 125 MILLILITER(S): 50; .745; 4.5 INJECTION, SOLUTION INTRAVENOUS at 05:22

## 2022-05-03 RX ADMIN — Medication 40 MILLIEQUIVALENT(S): at 08:42

## 2022-05-03 RX ADMIN — HEPARIN SODIUM 5000 UNIT(S): 5000 INJECTION INTRAVENOUS; SUBCUTANEOUS at 21:51

## 2022-05-03 RX ADMIN — MORPHINE SULFATE 4 MILLIGRAM(S): 50 CAPSULE, EXTENDED RELEASE ORAL at 06:19

## 2022-05-03 RX ADMIN — PIPERACILLIN AND TAZOBACTAM 25 GRAM(S): 4; .5 INJECTION, POWDER, LYOPHILIZED, FOR SOLUTION INTRAVENOUS at 08:44

## 2022-05-03 NOTE — PROGRESS NOTE ADULT - SUBJECTIVE AND OBJECTIVE BOX
1    INTERVAL HPI/OVERNIGHT EVENTS:        REVIEW OF SYSTEMS:  CONSTITUTIONAL:    abd  pain  poor  appetite    NECK: No pain or stiffnes  RESPIRATORY: No SOB   CARDIOVASCULAR: No chest pain, palpitations, dizziness,   GASTROINTESTINAL: No abdominal pain. No nausea, vomiting,   NEUROLOGICAL: No headaches, no  blurry  vision no  dizziness  SKIN: No itching,   MUSCULOSKELETAL: No pain    MEDICATION:  acetaminophen     Tablet .. 650 milliGRAM(s) Oral every 6 hours PRN  cloNIDine Patch 0.1 mG/24Hr(s) 1 patch Transdermal every 7 days  dextrose 5%. 1000 milliLiter(s) IV Continuous <Continuous>  dextrose 5%. 1000 milliLiter(s) IV Continuous <Continuous>  dextrose 50% Injectable 25 Gram(s) IV Push once  dextrose 50% Injectable 12.5 Gram(s) IV Push once  dextrose 50% Injectable 25 Gram(s) IV Push once  dextrose Oral Gel 15 Gram(s) Oral once PRN  glucagon  Injectable 1 milliGRAM(s) IntraMuscular once  heparin   Injectable 5000 Unit(s) SubCutaneous every 8 hours  insulin lispro (ADMELOG) corrective regimen sliding scale   SubCutaneous at bedtime  morphine  - Injectable 2 milliGRAM(s) IV Push every 4 hours PRN  morphine  - Injectable 4 milliGRAM(s) IV Push every 6 hours PRN  ondansetron Injectable 4 milliGRAM(s) IV Push every 6 hours PRN  pantoprazole    Tablet 40 milliGRAM(s) Oral two times a day  piperacillin/tazobactam IVPB.. 3.375 Gram(s) IV Intermittent every 8 hours  potassium chloride   Powder 40 milliEquivalent(s) Oral every 4 hours  sodium chloride 0.9% with potassium chloride 20 mEq/L 1000 milliLiter(s) IV Continuous <Continuous>    Vital Signs Last 24 Hrs  T(C): 36.9 (03 May 2022 10:29), Max: 37.4 (02 May 2022 17:17)  T(F): 98.5 (03 May 2022 10:29), Max: 99.4 (02 May 2022 17:17)  HR: 98 (03 May 2022 10:29) (96 - 103)  BP: 131/92 (03 May 2022 10:29) (108/66 - 131/92)  BP(mean): --  RR: 17 (03 May 2022 10:29) (17 - 18)  SpO2: 97% (03 May 2022 10:29) (94% - 98%)    PHYSICAL EXAM:  GENERAL: NAD, well-groomed, well-developed  HEENT : Conjuntivae  clear sclerae anicteric  NECK: Supple, No JVD, Normal thyroid  NERVOUS SYSTEM:  Alert oriented   no  focal  deficits;   CHEST/LUNG: Clear    HEART: Regular rate and rhythm; No murmurs, rubs, or gallops  ABDOMEN: Soft, mild  epigastric  tenderness no  guarding Nondistended; Bowel sounds present  EXTREMITIES:  no  edema no  tenderness  SKIN: No rashes   LABS:                        8.2    15.32 )-----------( 284      ( 03 May 2022 06:28 )             26.3     05-03    137  |  102  |  8   ----------------------------<  126<H>  2.8<LL>   |  28  |  0.75    Ca    8.2<L>      03 May 2022 06:28  Phos  2.9     05-03  Mg     2.1     05-03    TPro  6.1  /  Alb  2.1<L>  /  TBili  0.8  /  DBili  x   /  AST  11<L>  /  ALT  22  /  AlkPhos  107  05-03    PT/INR - ( 03 May 2022 06:28 )   PT: 13.8 sec;   INR: 1.16 ratio         PTT - ( 03 May 2022 06:28 )  PTT:27.1 sec  Urinalysis Basic - ( 01 May 2022 21:43 )    Color: Ivy / Appearance: Slightly Turbid / S.020 / pH: x  Gluc: x / Ketone: Large  / Bili: Negative / Urobili: Negative mg/dL   Blood: x / Protein: 100 mg/dL / Nitrite: Negative   Leuk Esterase: Small / RBC: 3-5 /HPF / WBC 0-2   Sq Epi: x / Non Sq Epi: Moderate / Bacteria: Moderate      CAPILLARY BLOOD GLUCOSE      POCT Blood Glucose.: 104 mg/dL (02 May 2022 22:17)      RADIOLOGY & ADDITIONAL TESTS:    Imaging reports  Personally Reviewed:  [ x] YES  [ ] NO    Consultant(s) Notes Reviewed:  [x ] YES  [ ] NO    Care Discussed with Consultants/Other Providers [x ] YES  [ ] NO  Problem/Plan - 1:  ·  Problem: Abdominal pain.   ·  Plan: morphine  prn.    Problem/Plan - 2:  ·  Problem: Dilated bile duct.   ·  Plan: mrcp  zosyn pain management  further w/u  as  per  GI    Problem/Plan - 3:  ·  Problem: Hypokalemia.   ·  Plan: supplement.    Problem/Plan - 4:  ·  Problem: DM (diabetes mellitus).   ·  Plan: insulin coverage.    Problem/Plan - 5:  ·  Problem: HTN (hypertension).   ·  Plan: clinidine patch.

## 2022-05-03 NOTE — PROGRESS NOTE ADULT - SUBJECTIVE AND OBJECTIVE BOX
SURGERY PROGRESS HPI:  Pt seen and examined at bedside. Pain is well controlled on pain medication. Pt tolerating small amounts of clear liquid diet. Pt denies nausea and vomiting. Having liquid BMs and passing flatus. Voiding well. Pt denies chest pain, SOB, dizziness, fever, chills. Ambulating.    Vital Signs Last 24 Hrs  T(C): 37.1 (02 May 2022 23:45), Max: 37.4 (02 May 2022 17:17)  T(F): 98.8 (02 May 2022 23:45), Max: 99.4 (02 May 2022 17:17)  HR: 101 (02 May 2022 23:45) (98 - 103)  BP: 108/66 (02 May 2022 23:45) (108/66 - 121/62)  BP(mean): --  RR: 18 (02 May 2022 23:45) (17 - 18)  SpO2: 94% (02 May 2022 23:45) (94% - 98%)    PHYSICAL EXAM:    CONSTITUTIONAL: NAD  HEENT:  Atraumatic, Normocephalic  RESPIRATORY: Clear to ausculation, bilaterally   CARDIOVASCULAR: RRR S1S2  GASTROINTESTINAL: non distended, +BS, soft, RUQ tenderness with minimal epigastric/RLQ tenderness, no guarding  MUSCULOSKELETAL: calf soft, non tender b/l    I&O's Detail    02 May 2022 07:01  -  03 May 2022 05:48  --------------------------------------------------------  IN:    Oral Fluid: 598 mL    sodium chloride 0.9% w/ Additives: 1500 mL  Total IN: 2098 mL    OUT:    Voided (mL): 2 mL  Total OUT: 2 mL    Total NET: 2096 mL      LABS:                        9.6    21.66 )-----------( 285      ( 02 May 2022 07:02 )             30.3     05-02    139  |  104  |  9   ----------------------------<  133<H>  3.3<L>   |  27  |  0.92    Ca    8.9      02 May 2022 07:02  Phos  2.4     -  Mg     1.8     -    TPro  6.7  /  Alb  2.6<L>  /  TBili  1.8<H>  /  DBili  x   /  AST  18  /  ALT  23  /  AlkPhos  103  05-    PT/INR - ( 01 May 2022 20:17 )   PT: 16.0 sec;   INR: 1.33 ratio         PTT - ( 01 May 2022 20:17 )  PTT:30.5 sec  Urinalysis Basic - ( 01 May 2022 21:43 )    Color: Ivy / Appearance: Slightly Turbid / S.020 / pH: x  Gluc: x / Ketone: Large  / Bili: Negative / Urobili: Negative mg/dL   Blood: x / Protein: 100 mg/dL / Nitrite: Negative   Leuk Esterase: Small / RBC: 3-5 /HPF / WBC 0-2   Sq Epi: x / Non Sq Epi: Moderate / Bacteria: Moderate      Culture Results:   No growth to date. ( @ 21:58)  Culture Results:   No growth to date. ( @ 21:58)    < from: MR MRCP No Cont (22 @ 16:13) >  FINDINGS: The examination is limited by lack of IV contrast.  LOWER CHEST: Small right atelectasis.    LIVER: Multiple mildly T2 hyperintense lesions are seen in the liver   involving both lobes with the largest measuring up to 4.4 cm in the left   lobe. These are difficult to further characterize without contrast.  BILE DUCTS: The upper common duct is mildly dilated measuring up to 8mm   in caliber with an abrupt transition in the mid common duct (image 13   series 12). This may be due to a stricture; a malignant stricture cannot   be excluded. No evidence for a common bile duct stone.  GALLBLADDER: Multiple gallstones in the gallbladder. No evidence for   thickened gallbladder wall or pericholecystic fluid.  SPLEEN: Within normal limits.  PANCREAS: Within normal limits.  ADRENALS: Within normal limits.  KIDNEYS/URETERS: Tiny brightly T2 hyperintense lesion in the left kidney,   representing a probable cyst. The right kidney appears unremarkable.    BLADDER: Within normal limits.  REPRODUCTIVE ORGANS: 2.6 cm subserosal fibroid in the uterine fundus. The   adnexa appear grossly unremarkable.    BOWEL: New extensive inflammatory changes in the right lower quadrant   abdomen. The terminal ileum appears thick-walled. Prominent small bowel   loop in the pelvis.  CT of the abdomen and pelvis with IV and oral contrast is recommended for   further evaluation.  PERITONEUM: Mild ascites in the abdomen and pelvis.  VESSELS: Within normal limits.  RETROPERITONEUM/LYMPH NODES: Again noted is a 9.0 x 6.3 cm mostly fatty   right retroperitoneal lesion with slight T2 hyperintensity. This may   represent a lipoma or a low-grade liposarcoma.    ABDOMINAL WALL: Within normal limits.  BONES: Within normal limits.    IMPRESSION:  New extensive inflammatory changes in the right lower quadrant abdomen.   The terminal ileum appears thick-walled. Prominent small bowel loop in   the pelvis.  CT of the abdomen and pelvis with IV and oral contrast is recommended for   further evaluation.    The upper common duct is mildly dilated measuring up to 8 mm in caliber   with an abrupt transition in the mid common duct. This may be due to a  stricture; a malignant stricture cannot be excluded. No evidence for a   common bile duct stone.    Cholelithiasis.    Multiple mildly T2 hyperintense lesions in the liver involving both lobes   with the largest measuring up to 4.4 cm in the left lobe. These are   difficult to further characterize without contrast.    Mild ascites.    Again noted is a 9.0 x 6.3 cm mostly fatty right retroperitoneal lesion   with slight T2 hyperintensity. This may represent a lipoma or a low-grade   liposarcoma.    --- End of Report ---    < end of copied text >        42F PMH HTN, DM, found to have retroperitoneal mass on last admission, a/w dilated CBD, elevated t bili 2.2, hypoK.  Leukocytosis, tachycardia. Per GI likely colitis.  MRCP /: New extensive inflammatory changes in RLQ, CBD 8mm with ?stricture, malignant structure cannot be excluded, no choledocholithiasis. Cholelithiasis, leisons in liver, right RP lesion.     - Clear liquid diet/IVF  - IV antibiotics  - Follow up AM labs  - Replete electrolytes as needed  - GI management. Possible EUS  - Will discuss with attending   SURGERY PROGRESS HPI:  Pt seen and examined at bedside. Pain is well controlled on pain medication. Pt tolerating small amounts of clear liquid diet. Pt denies nausea and vomiting. Having liquid BMs and passing flatus. Voiding well. Pt denies chest pain, SOB, dizziness, fever, chills. Ambulating.    Vital Signs Last 24 Hrs  T(C): 37.1 (02 May 2022 23:45), Max: 37.4 (02 May 2022 17:17)  T(F): 98.8 (02 May 2022 23:45), Max: 99.4 (02 May 2022 17:17)  HR: 101 (02 May 2022 23:45) (98 - 103)  BP: 108/66 (02 May 2022 23:45) (108/66 - 121/62)  BP(mean): --  RR: 18 (02 May 2022 23:45) (17 - 18)  SpO2: 94% (02 May 2022 23:45) (94% - 98%)    PHYSICAL EXAM:    CONSTITUTIONAL: NAD  HEENT:  Atraumatic, Normocephalic  RESPIRATORY: Clear to ausculation, bilaterally   CARDIOVASCULAR: RRR S1S2  GASTROINTESTINAL: non distended, +BS, soft, RUQ tenderness with minimal epigastric/RLQ tenderness, no guarding  MUSCULOSKELETAL: calf soft, non tender b/l    I&O's Detail    02 May 2022 07:01  -  03 May 2022 05:48  --------------------------------------------------------  IN:    Oral Fluid: 598 mL    sodium chloride 0.9% w/ Additives: 1500 mL  Total IN: 2098 mL    OUT:    Voided (mL): 2 mL  Total OUT: 2 mL    Total NET: 2096 mL      LABS:                        9.6    21.66 )-----------( 285      ( 02 May 2022 07:02 )             30.3     05-02    139  |  104  |  9   ----------------------------<  133<H>  3.3<L>   |  27  |  0.92    Ca    8.9      02 May 2022 07:02  Phos  2.4     -  Mg     1.8     -    TPro  6.7  /  Alb  2.6<L>  /  TBili  1.8<H>  /  DBili  x   /  AST  18  /  ALT  23  /  AlkPhos  103  05-    PT/INR - ( 01 May 2022 20:17 )   PT: 16.0 sec;   INR: 1.33 ratio         PTT - ( 01 May 2022 20:17 )  PTT:30.5 sec  Urinalysis Basic - ( 01 May 2022 21:43 )    Color: Ivy / Appearance: Slightly Turbid / S.020 / pH: x  Gluc: x / Ketone: Large  / Bili: Negative / Urobili: Negative mg/dL   Blood: x / Protein: 100 mg/dL / Nitrite: Negative   Leuk Esterase: Small / RBC: 3-5 /HPF / WBC 0-2   Sq Epi: x / Non Sq Epi: Moderate / Bacteria: Moderate      Culture Results:   No growth to date. ( @ 21:58)  Culture Results:   No growth to date. ( @ 21:58)    < from: MR MRCP No Cont (22 @ 16:13) >  FINDINGS: The examination is limited by lack of IV contrast.  LOWER CHEST: Small right atelectasis.    LIVER: Multiple mildly T2 hyperintense lesions are seen in the liver   involving both lobes with the largest measuring up to 4.4 cm in the left   lobe. These are difficult to further characterize without contrast.  BILE DUCTS: The upper common duct is mildly dilated measuring up to 8mm   in caliber with an abrupt transition in the mid common duct (image 13   series 12). This may be due to a stricture; a malignant stricture cannot   be excluded. No evidence for a common bile duct stone.  GALLBLADDER: Multiple gallstones in the gallbladder. No evidence for   thickened gallbladder wall or pericholecystic fluid.  SPLEEN: Within normal limits.  PANCREAS: Within normal limits.  ADRENALS: Within normal limits.  KIDNEYS/URETERS: Tiny brightly T2 hyperintense lesion in the left kidney,   representing a probable cyst. The right kidney appears unremarkable.    BLADDER: Within normal limits.  REPRODUCTIVE ORGANS: 2.6 cm subserosal fibroid in the uterine fundus. The   adnexa appear grossly unremarkable.    BOWEL: New extensive inflammatory changes in the right lower quadrant   abdomen. The terminal ileum appears thick-walled. Prominent small bowel   loop in the pelvis.  CT of the abdomen and pelvis with IV and oral contrast is recommended for   further evaluation.  PERITONEUM: Mild ascites in the abdomen and pelvis.  VESSELS: Within normal limits.  RETROPERITONEUM/LYMPH NODES: Again noted is a 9.0 x 6.3 cm mostly fatty   right retroperitoneal lesion with slight T2 hyperintensity. This may   represent a lipoma or a low-grade liposarcoma.    ABDOMINAL WALL: Within normal limits.  BONES: Within normal limits.    IMPRESSION:  New extensive inflammatory changes in the right lower quadrant abdomen.   The terminal ileum appears thick-walled. Prominent small bowel loop in   the pelvis.  CT of the abdomen and pelvis with IV and oral contrast is recommended for   further evaluation.    The upper common duct is mildly dilated measuring up to 8 mm in caliber   with an abrupt transition in the mid common duct. This may be due to a  stricture; a malignant stricture cannot be excluded. No evidence for a   common bile duct stone.    Cholelithiasis.    Multiple mildly T2 hyperintense lesions in the liver involving both lobes   with the largest measuring up to 4.4 cm in the left lobe. These are   difficult to further characterize without contrast.    Mild ascites.    Again noted is a 9.0 x 6.3 cm mostly fatty right retroperitoneal lesion   with slight T2 hyperintensity. This may represent a lipoma or a low-grade   liposarcoma.    --- End of Report ---    < end of copied text >        42F PMH HTN, DM, found to have retroperitoneal mass on last admission, a/w dilated CBD, elevated t bili 2.2, hypoK.  Leukocytosis, tachycardia. Per GI likely colitis.  MRCP : New extensive inflammatory changes in RLQ, CBD 8mm with ?stricture, malignant structure cannot be excluded, no choledocholithiasis. Cholelithiasis, leisons in liver, right RP lesion.     - Clear liquid diet/IVF  - IV antibiotics  - Follow up AM labs  - Replete electrolytes as needed  - GI management for stricture  - Will discuss with attending

## 2022-05-03 NOTE — PROGRESS NOTE ADULT - SUBJECTIVE AND OBJECTIVE BOX
Pt c/o mid abdominal pain    MR MRCP No Cont (05.02.22 @ 16:13) >  IMPRESSION:  New extensive inflammatory changes in the right lower quadrant abdomen.   The terminal ileum appears thick-walled. Prominent small bowel loop in   the pelvis.  CT of the abdomen and pelvis with IV and oral contrast is recommended for   further evaluation.  The upper common duct is mildly dilated measuring up to 8 mm in caliber   with an abrupt transition in the mid common duct. This may be due to a  stricture; a malignant stricture cannot be excluded. No evidence for a   common bile duct stone.  Cholelithiasis.  Multiple mildly T2 hyperintense lesions in the liver involving both lobes   with the largest measuring up to 4.4 cm in the left lobe. These are   difficult to further characterize without contrast.  Mild ascites.  Again noted is a 9.0 x 6.3 cm mostly fatty right retroperitoneal lesion   with slight T2 hyperintensity. This may represent a lipoma or a low-grade   liposarcoma.        MEDICATIONS  (STANDING):  cloNIDine Patch 0.1 mG/24Hr(s) 1 patch Transdermal every 7 days  dextrose 5%. 1000 milliLiter(s) (100 mL/Hr) IV Continuous <Continuous>  dextrose 5%. 1000 milliLiter(s) (50 mL/Hr) IV Continuous <Continuous>  dextrose 50% Injectable 25 Gram(s) IV Push once  dextrose 50% Injectable 12.5 Gram(s) IV Push once  dextrose 50% Injectable 25 Gram(s) IV Push once  glucagon  Injectable 1 milliGRAM(s) IntraMuscular once  heparin   Injectable 5000 Unit(s) SubCutaneous every 8 hours  insulin lispro (ADMELOG) corrective regimen sliding scale   SubCutaneous at bedtime  piperacillin/tazobactam IVPB.. 3.375 Gram(s) IV Intermittent every 8 hours  potassium chloride   Powder 40 milliEquivalent(s) Oral every 4 hours  sodium chloride 0.9% with potassium chloride 20 mEq/L 1000 milliLiter(s) (125 mL/Hr) IV Continuous <Continuous>    MEDICATIONS  (PRN):  acetaminophen     Tablet .. 650 milliGRAM(s) Oral every 6 hours PRN Temp greater or equal to 38C (100.4F), Mild Pain (1 - 3), Moderate Pain (4 - 6)  dextrose Oral Gel 15 Gram(s) Oral once PRN Blood Glucose LESS THAN 70 milliGRAM(s)/deciliter  morphine  - Injectable 2 milliGRAM(s) IV Push every 4 hours PRN Moderate Pain (4 - 6)  morphine  - Injectable 4 milliGRAM(s) IV Push every 6 hours PRN Severe Pain (7 - 10)  ondansetron Injectable 4 milliGRAM(s) IV Push every 6 hours PRN Nausea and/or Vomiting      Allergies    No Known Allergies    Intolerances        Vital Signs Last 24 Hrs  T(C): 36.9 (03 May 2022 10:29), Max: 37.4 (02 May 2022 17:17)  T(F): 98.5 (03 May 2022 10:29), Max: 99.4 (02 May 2022 17:17)  HR: 98 (03 May 2022 10:29) (96 - 103)  BP: 131/92 (03 May 2022 10:29) (108/66 - 131/92)  BP(mean): --  RR: 17 (03 May 2022 10:29) (17 - 18)  SpO2: 97% (03 May 2022 10:29) (94% - 98%)    PHYSICAL EXAM:  General: NAD.  CVS: S1, S2  Chest: air entry bilaterally present  Abd: BS present, soft, non-tender      LABS:                        8.2    15.32 )-----------( 284      ( 03 May 2022 06:28 )             26.3     05-03    137  |  102  |  8   ----------------------------<  126<H>  2.8<LL>   |  28  |  0.75    Ca    8.2<L>      03 May 2022 06:28  Phos  2.9     05-03  Mg     2.1     05-03    TPro  6.1  /  Alb  2.1<L>  /  TBili  0.8  /  DBili  x   /  AST  11<L>  /  ALT  22  /  AlkPhos  107  05-03    PT/INR - ( 03 May 2022 06:28 )   PT: 13.8 sec;   INR: 1.16 ratio         PTT - ( 03 May 2022 06:28 )  PTT:27.1 sec    Case discussed with Biliary service - Dr Alvarenga to review MRCP  WBC improving on antibx  Might eventually need colonoscopy for evaluation of thick walled terminal ileum

## 2022-05-04 LAB
ALBUMIN SERPL ELPH-MCNC: 2.2 G/DL — LOW (ref 3.3–5)
ALP SERPL-CCNC: 111 U/L — SIGNIFICANT CHANGE UP (ref 40–120)
ALT FLD-CCNC: 23 U/L — SIGNIFICANT CHANGE UP (ref 12–78)
ANION GAP SERPL CALC-SCNC: 6 MMOL/L — SIGNIFICANT CHANGE UP (ref 5–17)
AST SERPL-CCNC: 15 U/L — SIGNIFICANT CHANGE UP (ref 15–37)
BILIRUB SERPL-MCNC: 0.4 MG/DL — SIGNIFICANT CHANGE UP (ref 0.2–1.2)
BUN SERPL-MCNC: 7 MG/DL — SIGNIFICANT CHANGE UP (ref 7–23)
CALCIUM SERPL-MCNC: 8.5 MG/DL — SIGNIFICANT CHANGE UP (ref 8.5–10.1)
CHLORIDE SERPL-SCNC: 106 MMOL/L — SIGNIFICANT CHANGE UP (ref 96–108)
CO2 SERPL-SCNC: 26 MMOL/L — SIGNIFICANT CHANGE UP (ref 22–31)
CREAT SERPL-MCNC: 0.62 MG/DL — SIGNIFICANT CHANGE UP (ref 0.5–1.3)
EGFR: 113 ML/MIN/1.73M2 — SIGNIFICANT CHANGE UP
GLUCOSE BLDC GLUCOMTR-MCNC: 82 MG/DL — SIGNIFICANT CHANGE UP (ref 70–99)
GLUCOSE SERPL-MCNC: 106 MG/DL — HIGH (ref 70–99)
HCT VFR BLD CALC: 27.3 % — LOW (ref 34.5–45)
HGB BLD-MCNC: 8.4 G/DL — LOW (ref 11.5–15.5)
LIDOCAIN IGE QN: 94 U/L — SIGNIFICANT CHANGE UP (ref 73–393)
MCHC RBC-ENTMCNC: 24.7 PG — LOW (ref 27–34)
MCHC RBC-ENTMCNC: 30.8 G/DL — LOW (ref 32–36)
MCV RBC AUTO: 80.3 FL — SIGNIFICANT CHANGE UP (ref 80–100)
NRBC # BLD: 0 /100 WBCS — SIGNIFICANT CHANGE UP (ref 0–0)
PLATELET # BLD AUTO: 323 K/UL — SIGNIFICANT CHANGE UP (ref 150–400)
POTASSIUM SERPL-MCNC: 3.6 MMOL/L — SIGNIFICANT CHANGE UP (ref 3.5–5.3)
POTASSIUM SERPL-SCNC: 3.6 MMOL/L — SIGNIFICANT CHANGE UP (ref 3.5–5.3)
PROT SERPL-MCNC: 6.1 GM/DL — SIGNIFICANT CHANGE UP (ref 6–8.3)
RBC # BLD: 3.4 M/UL — LOW (ref 3.8–5.2)
RBC # FLD: 16.9 % — HIGH (ref 10.3–14.5)
SODIUM SERPL-SCNC: 138 MMOL/L — SIGNIFICANT CHANGE UP (ref 135–145)
WBC # BLD: 11.91 K/UL — HIGH (ref 3.8–10.5)
WBC # FLD AUTO: 11.91 K/UL — HIGH (ref 3.8–10.5)

## 2022-05-04 PROCEDURE — 99231 SBSQ HOSP IP/OBS SF/LOW 25: CPT

## 2022-05-04 RX ORDER — INSULIN LISPRO 100/ML
VIAL (ML) SUBCUTANEOUS
Refills: 0 | Status: DISCONTINUED | OUTPATIENT
Start: 2022-05-04 | End: 2022-05-06

## 2022-05-04 RX ADMIN — Medication 1 GRAM(S): at 11:14

## 2022-05-04 RX ADMIN — DEXTROSE MONOHYDRATE, SODIUM CHLORIDE, AND POTASSIUM CHLORIDE 125 MILLILITER(S): 50; .745; 4.5 INJECTION, SOLUTION INTRAVENOUS at 19:14

## 2022-05-04 RX ADMIN — PANTOPRAZOLE SODIUM 40 MILLIGRAM(S): 20 TABLET, DELAYED RELEASE ORAL at 17:11

## 2022-05-04 RX ADMIN — DEXTROSE MONOHYDRATE, SODIUM CHLORIDE, AND POTASSIUM CHLORIDE 125 MILLILITER(S): 50; .745; 4.5 INJECTION, SOLUTION INTRAVENOUS at 12:15

## 2022-05-04 RX ADMIN — Medication 1 GRAM(S): at 23:00

## 2022-05-04 RX ADMIN — PIPERACILLIN AND TAZOBACTAM 25 GRAM(S): 4; .5 INJECTION, POWDER, LYOPHILIZED, FOR SOLUTION INTRAVENOUS at 15:50

## 2022-05-04 RX ADMIN — PIPERACILLIN AND TAZOBACTAM 25 GRAM(S): 4; .5 INJECTION, POWDER, LYOPHILIZED, FOR SOLUTION INTRAVENOUS at 23:00

## 2022-05-04 RX ADMIN — MORPHINE SULFATE 4 MILLIGRAM(S): 50 CAPSULE, EXTENDED RELEASE ORAL at 06:10

## 2022-05-04 RX ADMIN — Medication 1 GRAM(S): at 17:11

## 2022-05-04 RX ADMIN — HEPARIN SODIUM 5000 UNIT(S): 5000 INJECTION INTRAVENOUS; SUBCUTANEOUS at 13:57

## 2022-05-04 RX ADMIN — HEPARIN SODIUM 5000 UNIT(S): 5000 INJECTION INTRAVENOUS; SUBCUTANEOUS at 05:51

## 2022-05-04 RX ADMIN — PANTOPRAZOLE SODIUM 40 MILLIGRAM(S): 20 TABLET, DELAYED RELEASE ORAL at 05:53

## 2022-05-04 RX ADMIN — Medication 1 GRAM(S): at 05:46

## 2022-05-04 RX ADMIN — DEXTROSE MONOHYDRATE, SODIUM CHLORIDE, AND POTASSIUM CHLORIDE 125 MILLILITER(S): 50; .745; 4.5 INJECTION, SOLUTION INTRAVENOUS at 05:54

## 2022-05-04 RX ADMIN — PIPERACILLIN AND TAZOBACTAM 25 GRAM(S): 4; .5 INJECTION, POWDER, LYOPHILIZED, FOR SOLUTION INTRAVENOUS at 07:30

## 2022-05-04 RX ADMIN — MORPHINE SULFATE 4 MILLIGRAM(S): 50 CAPSULE, EXTENDED RELEASE ORAL at 05:40

## 2022-05-04 NOTE — PROGRESS NOTE ADULT - SUBJECTIVE AND OBJECTIVE BOX
Pt stable but still c/o abd pain  Biliary consult appreciated  Spoke with DR Alvarenga - no plan for ERCP at this point    MR MRCP No Cont (05.02.22 @ 16:13) >  IMPRESSION:  New extensive inflammatory changes in the right lower quadrant abdomen.   The terminal ileum appears thick-walled. Prominent small bowel loop in   the pelvis.  CT of the abdomen and pelvis with IV and oral contrast is recommended for   further evaluation.    The upper common duct is mildly dilated measuring up to 8 mm in caliber   with an abrupt transition in the mid common duct. This may be due to a  stricture; a malignant stricture cannot be excluded. No evidence for a   common bile duct stone.    Cholelithiasis.    Multiple mildly T2 hyperintense lesions in the liver involving both lobes   with the largest measuring up to 4.4 cm in the left lobe. These are   difficult to further characterize without contrast.    Mild ascites.    Again noted is a 9.0 x 6.3 cm mostly fatty right retroperitoneal lesion   with slight T2 hyperintensity. This ma          MEDICATIONS  (STANDING):  cloNIDine Patch 0.1 mG/24Hr(s) 1 patch Transdermal every 7 days  dextrose 5%. 1000 milliLiter(s) (100 mL/Hr) IV Continuous <Continuous>  dextrose 5%. 1000 milliLiter(s) (50 mL/Hr) IV Continuous <Continuous>  dextrose 50% Injectable 25 Gram(s) IV Push once  dextrose 50% Injectable 12.5 Gram(s) IV Push once  dextrose 50% Injectable 25 Gram(s) IV Push once  glucagon  Injectable 1 milliGRAM(s) IntraMuscular once  heparin   Injectable 5000 Unit(s) SubCutaneous every 8 hours  insulin lispro (ADMELOG) corrective regimen sliding scale   SubCutaneous at bedtime  pantoprazole    Tablet 40 milliGRAM(s) Oral two times a day  piperacillin/tazobactam IVPB.. 3.375 Gram(s) IV Intermittent every 8 hours  sodium chloride 0.9% with potassium chloride 20 mEq/L 1000 milliLiter(s) (125 mL/Hr) IV Continuous <Continuous>  sucralfate suspension 1 Gram(s) Oral four times a day    MEDICATIONS  (PRN):  acetaminophen     Tablet .. 650 milliGRAM(s) Oral every 6 hours PRN Temp greater or equal to 38C (100.4F), Mild Pain (1 - 3), Moderate Pain (4 - 6)  dextrose Oral Gel 15 Gram(s) Oral once PRN Blood Glucose LESS THAN 70 milliGRAM(s)/deciliter  morphine  - Injectable 2 milliGRAM(s) IV Push every 4 hours PRN Moderate Pain (4 - 6)  morphine  - Injectable 4 milliGRAM(s) IV Push every 6 hours PRN Severe Pain (7 - 10)  ondansetron Injectable 4 milliGRAM(s) IV Push every 6 hours PRN Nausea and/or Vomiting      Allergies    No Known Allergies    Intolerances        Vital Signs Last 24 Hrs  T(C): 36.6 (04 May 2022 11:08), Max: 37 (04 May 2022 04:30)  T(F): 97.9 (04 May 2022 11:08), Max: 98.6 (04 May 2022 04:30)  HR: 93 (04 May 2022 11:08) (93 - 97)  BP: 133/86 (04 May 2022 11:08) (125/84 - 146/93)  BP(mean): --  RR: 18 (04 May 2022 11:08) (18 - 18)  SpO2: 100% (04 May 2022 11:08) (97% - 100%)    PHYSICAL EXAM:  General: NAD.  CVS: S1, S2  Chest: air entry bilaterally present  Abd: BS present, soft, non-tender      LABS:                        8.4    11.91 )-----------( 323      ( 04 May 2022 06:45 )             27.3     05-04    138  |  106  |  7   ----------------------------<  106<H>  3.6   |  26  |  0.62    Ca    8.5      04 May 2022 06:45  Phos  2.9     05-03  Mg     2.1     05-03    TPro  6.1  /  Alb  2.2<L>  /  TBili  0.4  /  DBili  x   /  AST  15  /  ALT  23  /  AlkPhos  111  05-04    PT/INR - ( 03 May 2022 06:28 )   PT: 13.8 sec;   INR: 1.16 ratio         PTT - ( 03 May 2022 06:28 )  PTT:27.1 sec    might need to consider colonoscopy for further eval of Cecum/terminal ileum  will ask for Biliary f/u

## 2022-05-04 NOTE — CONSULT NOTE ADULT - SUBJECTIVE AND OBJECTIVE BOX
HEPATOBILIARY CONSULT:     Chief Complaint:  Patient is a 42y old  Female who presents with a chief complaint of abdominal pain  hypokalemia dilated CBD (04 May 2022 09:43)      HPI:   42 years old female walked in c/o diffused abd cramping and diarrhea 4 to 5 times per day for 4 days and diarrhea is improved since 2 days ago   but  the the cramps continue. Pt had ct of abd/pelvis + mass to the right retroperitoneum mass Pt sts she knows about the mass and had been followed by pmd for 5 years  Pt also had hx of abd cholelithiasis but no acute cholecystitis. Pt had low postassium 2.6 two days ago. Pt denies headache, dizziness, blurred visions light sensitivities, focal/distal weakness or numbness, neck/back/hips pain, cough, sob, chest pain, nausea, vomiting, fever, chills, dysuria or vaginal spotting pt sts the is not at risk of beign pregnant. Pt had hcg negative two days ago. states  was  evaluted  at  [facility] 4 months  for  same  symptoms  was  told  it  was  gastroparesis 2/2 dm foud  to  have  dilated  CBD hypokalemia leucocytosis  admitted  for  further w/u  and  RX  Surgical and  GI called  by  Er MD (01 May 2022 19:25). Hepatobiliary consult for dilated CBD      PMH/PSH:PAST MEDICAL & SURGICAL HISTORY:  Essential hypertension    Diabetes mellitus, type 2    Abdominal mass    History of         Allergies:  No Known Allergies      Medications:  acetaminophen     Tablet .. 650 milliGRAM(s) Oral every 6 hours PRN  cloNIDine Patch 0.1 mG/24Hr(s) 1 patch Transdermal every 7 days  dextrose 5%. 1000 milliLiter(s) IV Continuous <Continuous>  dextrose 5%. 1000 milliLiter(s) IV Continuous <Continuous>  dextrose 50% Injectable 25 Gram(s) IV Push once  dextrose 50% Injectable 12.5 Gram(s) IV Push once  dextrose 50% Injectable 25 Gram(s) IV Push once  dextrose Oral Gel 15 Gram(s) Oral once PRN  glucagon  Injectable 1 milliGRAM(s) IntraMuscular once  heparin   Injectable 5000 Unit(s) SubCutaneous every 8 hours  insulin lispro (ADMELOG) corrective regimen sliding scale   SubCutaneous at bedtime  morphine  - Injectable 2 milliGRAM(s) IV Push every 4 hours PRN  morphine  - Injectable 4 milliGRAM(s) IV Push every 6 hours PRN  ondansetron Injectable 4 milliGRAM(s) IV Push every 6 hours PRN  pantoprazole    Tablet 40 milliGRAM(s) Oral two times a day  piperacillin/tazobactam IVPB.. 3.375 Gram(s) IV Intermittent every 8 hours  sodium chloride 0.9% with potassium chloride 20 mEq/L 1000 milliLiter(s) IV Continuous <Continuous>  sucralfate suspension 1 Gram(s) Oral four times a day      Review of Systems:    General:  No weight loss, fevers, chills, night sweats, fatigue,   Eyes:  Good vision, no reported pain  ENT:  No sore throat, pain, runny nose, dysphagia  CV:  No pain, palpitations, hypo/hypertension  Resp:  No dyspnea, cough, tachypnea, wheezing  GI:  No pain, No nausea, No vomiting, No diarrhea, No constipation, No pruritis, No rectal bleeding, No tarry stools, No dysphagia,  :  No pain, bleeding, incontinence, nocturia  Muscle:  No pain, weakness  Breast:  No pain, abscess, mass, discharge  Neuro:  No weakness, tingling, memory problems  Psych:  No fatigue, insomnia, mood problems, depression  Endocrine:  No polyuria, polydipsia, cold/heat intolerance  Heme:  No petechiae, ecchymosis, easy bruisability  Skin:  No rash, tattoos, scars, edema    Relevant Family History:   FAMILY HISTORY:  Family hx of hypertension (Father)    FHx: diabetes mellitus (Mother)    Family history of CVA (Father)        Relevant Social History: Alcohol ( -) , Tobacco ( -) , Illicit drugs (- )     Physical Exam:    Vital Signs:  Vital Signs Last 24 Hrs  T(C): 37 (04 May 2022 04:30), Max: 37 (04 May 2022 04:30)  T(F): 98.6 (04 May 2022 04:30), Max: 98.6 (04 May 2022 04:30)  HR: 97 (04 May 2022 04:30) (94 - 98)  BP: 135/92 (04 May 2022 04:30) (125/84 - 146/93)  BP(mean): --  RR: 18 (04 May 2022 04:30) (17 - 18)  SpO2: 97% (04 May 2022 04:30) (97% - 99%)  Daily     Daily Weight in k.4 (04 May 2022 04:30)    General:  Appears stated age, well-groomed, well-nourished, no distress  HEENT:  NC/AT,  conjunctivae clear and pink, no thyromegaly, nodules, adenopathy, no JVD, anicteric sclera  Chest:  Full & symmetric excursion, no increased effort, breath sounds clear  Cardiovascular:  Regular rhythm, S1, S2, no murmur/rub/S3/S4, no abdominal bruit, no edema  Abdomen:  Soft, non tender, non distended, normoactive bowel sounds,  no masses , no hepatosplenomegaly, no signs of chronic liver disease  Extremities:  no cyanosis, clubbing or edema  Skin:  No rash/erythema/ecchymoses/petechiae/wounds/abscess/warm/dry  Neuro/Psych:  Alert, oriented, no asterixis, no tremor, no encephalopathy    Laboratory:                          8.4    11.91 )-----------( 323      ( 04 May 2022 06:45 )             27.3     05-04    138  |  106  |  7   ----------------------------<  106<H>  3.6   |  26  |  0.62    Ca    8.5      04 May 2022 06:45  Phos  2.9     05-03  Mg     2.1     05-    TPro  6.1  /  Alb  2.2<L>  /  TBili  0.4  /  DBili  x   /  AST  15  /  ALT  23  /  AlkPhos  111  05-04    LIVER FUNCTIONS - ( 04 May 2022 06:45 )  Alb: 2.2 g/dL / Pro: 6.1 gm/dL / ALK PHOS: 111 U/L / ALT: 23 U/L / AST: 15 U/L / GGT: x           PT/INR - ( 03 May 2022 06:28 )   PT: 13.8 sec;   INR: 1.16 ratio         PTT - ( 03 May 2022 06:28 )  PTT:27.1 sec    Amylase Serum--      Lipase serum94 U/L       Ammonia--      Intake and Output    22 @ 07:01  -  22 @ 07:00  --------------------------------------------------------  IN: 360 mL / OUT: 2 mL / NET: 358 mL        Imaging:     HEPATOBILIARY CONSULT:     Chief Complaint:  Patient is a 42y old  Female who presents with a chief complaint of abdominal pain  hypokalemia dilated CBD (04 May 2022 09:43)      HPI:   42 years old female walked in c/o diffused abd cramping and diarrhea 4 to 5 times per day for 4 days and diarrhea is improved since 2 days ago   but  the the cramps continue. Pt had ct of abd/pelvis + mass to the right retroperitoneum mass Pt sts she knows about the mass and had been followed by pmd for 5 years  Pt also had hx of abd cholelithiasis but no acute cholecystitis. Pt had low postassium 2.6 two days ago. Pt denies headache, dizziness, blurred visions light sensitivities, focal/distal weakness or numbness, neck/back/hips pain, cough, sob, chest pain, nausea, vomiting, fever, chills, dysuria or vaginal spotting pt sts the is not at risk of beign pregnant. Pt had hcg negative two days ago. states  was  evaluted  at  [facility] 4 months  for  same  symptoms  was  told  it  was  gastroparesis 2/2 dm foud  to  have  dilated  CBD hypokalemia leucocytosis  admitted  for  further w/u  and  RX  Surgical and  GI called  by  Er MD (01 May 2022 19:25). Hepatobiliary consult for dilated CBD      PMH/PSH:PAST MEDICAL & SURGICAL HISTORY:  Essential hypertension    Diabetes mellitus, type 2    Abdominal mass    History of         Allergies:  No Known Allergies      Medications:  acetaminophen     Tablet .. 650 milliGRAM(s) Oral every 6 hours PRN  cloNIDine Patch 0.1 mG/24Hr(s) 1 patch Transdermal every 7 days  dextrose 5%. 1000 milliLiter(s) IV Continuous <Continuous>  dextrose 5%. 1000 milliLiter(s) IV Continuous <Continuous>  dextrose 50% Injectable 25 Gram(s) IV Push once  dextrose 50% Injectable 12.5 Gram(s) IV Push once  dextrose 50% Injectable 25 Gram(s) IV Push once  dextrose Oral Gel 15 Gram(s) Oral once PRN  glucagon  Injectable 1 milliGRAM(s) IntraMuscular once  heparin   Injectable 5000 Unit(s) SubCutaneous every 8 hours  insulin lispro (ADMELOG) corrective regimen sliding scale   SubCutaneous at bedtime  morphine  - Injectable 2 milliGRAM(s) IV Push every 4 hours PRN  morphine  - Injectable 4 milliGRAM(s) IV Push every 6 hours PRN  ondansetron Injectable 4 milliGRAM(s) IV Push every 6 hours PRN  pantoprazole    Tablet 40 milliGRAM(s) Oral two times a day  piperacillin/tazobactam IVPB.. 3.375 Gram(s) IV Intermittent every 8 hours  sodium chloride 0.9% with potassium chloride 20 mEq/L 1000 milliLiter(s) IV Continuous <Continuous>  sucralfate suspension 1 Gram(s) Oral four times a day      Review of Systems:    General:  No weight loss, fevers, chills, night sweats, fatigue,   Eyes:  Good vision, no reported pain  ENT:  No sore throat, pain, runny nose, dysphagia  CV:  No pain, palpitations, hypo/hypertension  Resp:  No dyspnea, cough, tachypnea, wheezing  GI:  as per HPI   :  No pain, bleeding, incontinence, nocturia  Muscle:  No pain, weakness  Breast:  No pain, abscess, mass, discharge  Neuro:  No weakness, tingling, memory problems  Psych:  No fatigue, insomnia, mood problems, depression  Endocrine:  No polyuria, polydipsia, cold/heat intolerance  Heme:  No petechiae, ecchymosis, easy bruisability  Skin:  No rash, tattoos, scars, edema    Relevant Family History:   FAMILY HISTORY:  Family hx of hypertension (Father)    FHx: diabetes mellitus (Mother)    Family history of CVA (Father)        Relevant Social History: Alcohol ( -) , Tobacco ( -) , Illicit drugs (- )     Physical Exam:    Vital Signs:  Vital Signs Last 24 Hrs  T(C): 37 (04 May 2022 04:30), Max: 37 (04 May 2022 04:30)  T(F): 98.6 (04 May 2022 04:30), Max: 98.6 (04 May 2022 04:30)  HR: 97 (04 May 2022 04:30) (94 - 98)  BP: 135/92 (04 May 2022 04:30) (125/84 - 146/93)  BP(mean): --  RR: 18 (04 May 2022 04:30) (17 - 18)  SpO2: 97% (04 May 2022 04:30) (97% - 99%)  Daily     Daily Weight in k.4 (04 May 2022 04:30)    General:  Appears stated age, well-groomed, well-nourished, no distress  HEENT:  NC/AT,  conjunctivae clear and pink, no thyromegaly, nodules, adenopathy, no JVD, anicteric sclera  Chest:  Full & symmetric excursion, no increased effort, breath sounds clear  Cardiovascular:  Regular rhythm, S1, S2, no murmur/rub/S3/S4, no abdominal bruit, no edema  Abdomen:  Soft, non tender, non distended, normoactive bowel sounds,  no masses , no hepatosplenomegaly, no signs of chronic liver disease  Extremities:  no cyanosis, clubbing or edema  Skin:  No rash/erythema/ecchymoses/petechiae/wounds/abscess/warm/dry  Neuro/Psych:  Alert, oriented, no asterixis, no tremor, no encephalopathy    Laboratory:                          8.4    11.91 )-----------( 323      ( 04 May 2022 06:45 )             27.3     05-04    138  |  106  |  7   ----------------------------<  106<H>  3.6   |  26  |  0.62    Ca    8.5      04 May 2022 06:45  Phos  2.9     05-03  Mg     2.1     05-03    TPro  6.1  /  Alb  2.2<L>  /  TBili  0.4  /  DBili  x   /  AST  15  /  ALT  23  /  AlkPhos  111  05-04    LIVER FUNCTIONS - ( 04 May 2022 06:45 )  Alb: 2.2 g/dL / Pro: 6.1 gm/dL / ALK PHOS: 111 U/L / ALT: 23 U/L / AST: 15 U/L / GGT: x           PT/INR - ( 03 May 2022 06:28 )   PT: 13.8 sec;   INR: 1.16 ratio         PTT - ( 03 May 2022 06:28 )  PTT:27.1 sec    Amylase Serum--      Lipase serum94 U/L       Ammonia--      Intake and Output    22 @ 07:01  -  22 @ 07:00  --------------------------------------------------------  IN: 360 mL / OUT: 2 mL / NET: 358 mL        Imaging:      ACC: [id number] EXAM:  CT ABDOMEN AND PELVIS OC IC                          PROCEDURE DATE:  2022          INTERPRETATION:  CLINICAL INFORMATION: Severe diffuse abdominal pain    COMPARISON: None.    CONTRAST/COMPLICATIONS:  IV Contrast: Omnipaque 350  90 cc administered   10 cc discarded  Oral Contrast: Omnipaque 300  Complications: None reported at time of study completion    PROCEDURE:  CT of the Abdomen and Pelvis was performed.  Sagittal and coronal reformats were performed.    FINDINGS:  LOWER CHEST: Small bibasilar atelectasis.    LIVER: There is an indeterminant hypodense lesion with peripheral   nodularity in the left hepatic lobe measuring 4.7 x 4.3 cm.  BILE DUCTS: Normal caliber.  GALLBLADDER: Cholelithiasis.  SPLEEN: Withinnormal limits.  PANCREAS: Within normal limits.  ADRENALS: Within normal limits.  KIDNEYS/URETERS: Subcentimeter hypodensity in the left kidney, too small   to characterize.    BLADDER: Within normal limits.  REPRODUCTIVE ORGANS: Fibroid uterus. IUD in place    BOWEL: No bowel obstruction. Appendix is not visualized. No evidence of   inflammation in the pericecal region.  PERITONEUM: Small amount of mildly complex free fluid in the pelvis  VESSELS: Within normal limits.  RETROPERITONEUM/LYMPH NODES: There is a prominently fatty lesion in the   right retroperitoneum which measures 9.0 x 6.3 x 9.2 cm with subtle   internal stranding, which surrounds the right inferior iliopsoas muscle   and causes mild mass effect on the cecum.  ABDOMINAL WALL:Postsurgical changes.  BONES: Mild degenerative changes of the lower thoracic spine.    IMPRESSION:  There is a prominently fatty lesion in the right retroperitoneum which   measures 9.0 x 6.3 x 9.2 cm with subtle internal stranding, which   surrounds the right inferior iliopsoas muscle and causes mild mass effect   on the cecum. 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    Indeterminant 4.7 cm lesion in the left hepatic lobe should also be   further evaluated with MRI.    Additional findings as above.    --- End of Report ---            XENA CHENG MD; Attending Radiologist  This document has been electronically signed. 2022  8:42AM    ACC: [id number] EXAM:  CT ABDOMEN AND PELVIS OC IC                          PROCEDURE DATE:  2022          INTERPRETATION:  CLINICAL INFORMATION: Severe diffuse abdominal pain    COMPARISON: None.    CONTRAST/COMPLICATIONS:  IV Contrast: Omnipaque 350  90 cc administered   10 cc discarded  Oral Contrast: Omnipaque 300  Complications: None reported at time of study completion    PROCEDURE:  CT of the Abdomen and Pelvis was performed.  Sagittal and coronal reformats were performed.    FINDINGS:  LOWER CHEST: Small bibasilar atelectasis.    LIVER: There is an indeterminant hypodense lesion with peripheral   nodularity in the left hepatic lobe measuring 4.7 x 4.3 cm.  BILE DUCTS: Normal caliber.  GALLBLADDER: Cholelithiasis.  SPLEEN: Withinnormal limits.  PANCREAS: Within normal limits.  ADRENALS: Within normal limits.  KIDNEYS/URETERS: Subcentimeter hypodensity in the left kidney, too small   to characterize.    BLADDER: Within normal limits.  REPRODUCTIVE ORGANS: Fibroid uterus. IUD in place    BOWEL: No bowel obstruction. Appendix is not visualized. No evidence of   inflammation in the pericecal region.  PERITONEUM: Small amount of mildly complex free fluid in the pelvis  VESSELS: Within normal limits.  RETROPERITONEUM/LYMPH NODES: There is a prominently fatty lesion in the   right retroperitoneum which measures 9.0 x 6.3 x 9.2 cm with subtle   internal stranding, which surrounds the right inferior iliopsoas muscle   and causes mild mass effect on the cecum.  ABDOMINAL WALL:Postsurgical changes.  BONES: Mild degenerative changes of the lower thoracic spine.    IMPRESSION:  There is a prominently fatty lesion in the right retroperitoneum which   measures 9.0 x 6.3 x 9.2 cm with subtle internal stranding, which   surrounds the right inferior iliopsoas muscle and causes mild mass effect   on the cecum. 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    Indeterminant 4.7 cm lesion in the left hepatic lobe should also be   further evaluated with MRI.    Additional findings as above.    --- End of Report ---            XENA CHENG MD; Attending Radiologist  This document has been electronically signed. 2022  8:42AM    ACC: [id number] EXAM:  CT ABDOMEN AND PELVIS OC IC                          PROCEDURE DATE:  2022          INTERPRETATION:  CLINICAL INFORMATION: Severe diffuse abdominal pain    COMPARISON: None.    CONTRAST/COMPLICATIONS:  IV Contrast: Omnipaque 350  90 cc administered   10 cc discarded  Oral Contrast: Omnipaque 300  Complications: None reported at time of study completion    PROCEDURE:  CT of the Abdomen and Pelvis was performed.  Sagittal and coronal reformats were performed.    FINDINGS:  LOWER CHEST: Small bibasilar atelectasis.    LIVER: There is an indeterminant hypodense lesion with peripheral   nodularity in the left hepatic lobe measuring 4.7 x 4.3 cm.  < from: MR MRCP No Cont (22 @ 16:13) >    ACC: [id number] EXAM:  MR PELVIS                        ACC: [id number] EXAM:  MR MRCP                          PROCEDURE DATE:  2022          INTERPRETATION:  CLINICAL INFORMATION: Abdominal pain, diarrhea,   jaundice, and elevated total bilirubin.    COMPARISON: No prior abdominal/pelvic MR is available for comparison.   Reference is made with a previous abdominal/pelvic CT dated 2022.    CONTRAST/COMPLICATIONS:  IV Contrast: NONE  Oral Contrast: NONE  Complications: None reported at time of study completion    PROCEDURE:  MRI of the abdomen and pelvis was performed.  MRCP images were acquired.    FINDINGS: The examination is limited by lack of IV contrast.  LOWER CHEST: Small right atelectasis.    LIVER: Multiple mildly T2 hyperintense lesions are seen in the liver   involving both lobes with the largest measuring up to 4.4 cm in the left   lobe. These are difficult to further characterize without contrast.  BILE DUCTS: The upper common duct is mildly dilated measuring up to 8mm   in caliber with an abrupt transition in the mid common duct (image 13   series 12). This may be due to a stricture; a malignant stricture cannot   be excluded. No evidence for a common bile duct stone.  GALLBLADDER: Multiple gallstones in the gallbladder. No evidence for   thickened gallbladder wall or pericholecystic fluid.  SPLEEN: Within normal limits.  PANCREAS: Within normal limits.  ADRENALS: Within normal limits.  KIDNEYS/URETERS: Tiny brightly T2 hyperintense lesion in the left kidney,   representing a probable cyst. The right kidney appears unremarkable.    BLADDER: Within normal limits.  REPRODUCTIVE ORGANS: 2.6 cm subserosal fibroid in the uterine fundus. The   adnexa appear grossly unremarkable.    BOWEL: New extensive inflammatory changes in the right lower quadrant   abdomen. The terminal ileum appears thick-walled. Prominent small bowel   loop in the pelvis.  CT of the abdomen and pelvis with IV and oral contrast is recommended for   further evaluation.  PERITONEUM: Mild ascites in the abdomen and pelvis.  VESSELS: Within normal limits.  RETROPERITONEUM/LYMPH NODES: Again noted is a 9.0 x 6.3 cm mostly fatty   right retroperitoneal lesion with slight T2 hyperintensity. This may   represent a lipoma or a low-grade liposarcoma.    ABDOMINAL WALL: Within normal limits.  BONES: Within normal limits.    IMPRESSION:  New extensive inflammatory changes in the right lower quadrant abdomen.   The terminal ileum appears thick-walled. Prominent small bowel loop in   the pelvis.  CT of the abdomen and pelvis with IV and oral contrast is recommended for   further evaluation.    The upper common duct is mildly dilated measuring up to 8 mm in caliber   with an abrupt transition in the mid common duct. This may be due to a  stricture; a malignant stricture cannot be excluded. No evidence for a   common bile duct stone.    Cholelithiasis.    Multiple mildly T2 hyperintense lesions in the liver involving both lobes   with the largest measuring up to 4.4 cm in the left lobe. These are   difficult to further characterize without contrast.    Mild ascites.    Again noted is a 9.0 x 6.3 cm mostly fatty right retroperitoneal lesion   with slight T2 hyperintensity. This may represent a lipoma or a low-grade   liposarcoma.    --- End of Report ---            IVAN FRY MD; Attending Radiologist  This document has been electronically signed. May  2 2022  5:14PM    < end of copied text >  BILE DUCTS: Normal caliber.  GALLBLADDER: Cholelithiasis.  SPLEEN: Withinnormal limits.  PANCREAS: Within normal limits.  ADRENALS: Within normal limits.  KIDNEYS/URETERS: Subcentimeter hypodensity in the left kidney, too small   to characterize.    BLADDER: Within normal limits.  REPRODUCTIVE ORGANS: Fibroid uterus. IUD in place    BOWEL: No bowel obstruction. Appendix is not visualized. No evidence of   inflammation in the pericecal region.  PERITONEUM: Small amount of mildly complex free fluid in the pelvis  VESSELS: Within normal limits.  RETROPERITONEUM/LYMPH NODES: There is a prominently fatty lesion in the   right retroperitoneum which measures 9.0 x 6.3 x 9.2 cm with subtle   internal stranding, which surrounds the right inferior iliopsoas muscle   and causes mild mass effect on the cecum.  ABDOMINAL WALL:Postsurgical changes.  BONES: Mild degenerative changes of the lower thoracic spine.    IMPRESSION:  There is a prominently fatty lesion in the right retroperitoneum which   measures 9.0 x 6.3 x 9.2 cm with subtle internal stranding, which   surrounds the right inferior iliopsoas muscle and causes mild mass effect   on the cecum. 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    Indeterminant 4.7 cm lesion in the left hepatic lobe should also be   further evaluated with MRI.    Additional findings as above.    --- End of Report ---            XENA CHENG MD; Attending Radiologist  This document has been electronically signed. 2022  8:42AM

## 2022-05-04 NOTE — PROGRESS NOTE ADULT - SUBJECTIVE AND OBJECTIVE BOX
INTERVAL HPI/OVERNIGHT EVENTS:        REVIEW OF SYSTEMS:  CONSTITUTIONAL: continues  with  abdominal pain     NECK: No pain or stiffnes  RESPIRATORY: No SOB   CARDIOVASCULAR: No chest pain, palpitations, dizziness,   GASTROINTESTINAL abdominal pain. No nausea, vomiting,   NEUROLOGICAL: No headaches, no  blurry  vision no  dizziness  SKIN: No itching,   MUSCULOSKELETAL: No pain    MEDICATION:  acetaminophen     Tablet .. 650 milliGRAM(s) Oral every 6 hours PRN  cloNIDine Patch 0.1 mG/24Hr(s) 1 patch Transdermal every 7 days  dextrose 5%. 1000 milliLiter(s) IV Continuous <Continuous>  dextrose 5%. 1000 milliLiter(s) IV Continuous <Continuous>  dextrose 50% Injectable 25 Gram(s) IV Push once  dextrose 50% Injectable 12.5 Gram(s) IV Push once  dextrose 50% Injectable 25 Gram(s) IV Push once  dextrose Oral Gel 15 Gram(s) Oral once PRN  glucagon  Injectable 1 milliGRAM(s) IntraMuscular once  heparin   Injectable 5000 Unit(s) SubCutaneous every 8 hours  insulin lispro (ADMELOG) corrective regimen sliding scale   SubCutaneous at bedtime  morphine  - Injectable 2 milliGRAM(s) IV Push every 4 hours PRN  morphine  - Injectable 4 milliGRAM(s) IV Push every 6 hours PRN  ondansetron Injectable 4 milliGRAM(s) IV Push every 6 hours PRN  pantoprazole    Tablet 40 milliGRAM(s) Oral two times a day  piperacillin/tazobactam IVPB.. 3.375 Gram(s) IV Intermittent every 8 hours  sodium chloride 0.9% with potassium chloride 20 mEq/L 1000 milliLiter(s) IV Continuous <Continuous>  sucralfate suspension 1 Gram(s) Oral four times a day    Vital Signs Last 24 Hrs  T(C): 37 (04 May 2022 04:30), Max: 37 (04 May 2022 04:30)  T(F): 98.6 (04 May 2022 04:30), Max: 98.6 (04 May 2022 04:30)  HR: 97 (04 May 2022 04:30) (94 - 98)  BP: 135/92 (04 May 2022 04:30) (125/84 - 146/93)  BP(mean): --  RR: 18 (04 May 2022 04:30) (17 - 18)  SpO2: 97% (04 May 2022 04:30) (97% - 99%)    PHYSICAL EXAM:  GENERAL: NAD, well-groomed, well-developed  HEENT : Conjuntivae  clear sclerae anicteric  NECK: Supple, No JVD, Normal thyroid  NERVOUS SYSTEM:  Alert oriented   no  focal  deficits;   CHEST/LUNG: Clear    HEART: Regular rate and rhythm; No murmurs, rubs, or gallops  ABDOMEN: Soft,  diffuse  tenderness  epigastric  and  ruq  no  guarding  tender, Nondistended; Bowel sounds present  EXTREMITIES:  no  edema no  tenderness  SKIN: No rashes   LABS:                        8.4    11.91 )-----------( 323      ( 04 May 2022 06:45 )             27.3     05-04    138  |  106  |  7   ----------------------------<  106<H>  3.6   |  26  |  0.62    Ca    8.5      04 May 2022 06:45  Phos  2.9     05-03  Mg     2.1     05-03    TPro  6.1  /  Alb  2.2<L>  /  TBili  0.4  /  DBili  x   /  AST  15  /  ALT  23  /  AlkPhos  111  05-04    PT/INR - ( 03 May 2022 06:28 )   PT: 13.8 sec;   INR: 1.16 ratio         PTT - ( 03 May 2022 06:28 )  PTT:27.1 sec    CAPILLARY BLOOD GLUCOSE      POCT Blood Glucose.: 97 mg/dL (03 May 2022 22:03)      RADIOLOGY & ADDITIONAL TESTS:    Imaging reports  Personally Reviewed:  [ ] YES  [ ] NO    Consultant(s) Notes Reviewed:  [x ] YES  [ ] NO    Care Discussed with Consultants/Other Providers [x ] YES  [ ] NO  Problem/Plan - 1:  ·  Problem: Abdominal pain.   ·  Plan: morphine  prn.    Problem/Plan - 2:  ·  Problem: Dilated bile duct.   ·  Plan: mrcp  zosyn pain management  further w/u  as  per  GI    Problem/Plan - 3:  ·  Problem: Hypokalemia.   ·  Plan: supplement.    Problem/Plan - 4:  ·  Problem: DM (diabetes mellitus).   ·  Plan: insulin coverage.    Problem/Plan - 5:  ·  Problem: HTN (hypertension).   ·  Plan: clinidine patch.

## 2022-05-04 NOTE — PROGRESS NOTE ADULT - NS ATTEND AMEND GEN_ALL_CORE FT
Patient seen and examined with PAs  States her abdominal pain is improved  No nausea or vomiting    MRI yesterday concerning for:  - possible stricture at mid CBD, no choledocholithiasis.   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 Issa Barillas [phone]
CT from 4/29 concerning for right sided RP mass   Elevated LFTs with dilated ducts    F/U MRI for evaluation of right sided RP mass and evaluation of biliary tree
Patient seen and examined with PAs  Continues to have some abdominal pain, relieved with opiates  Denies nausea or vomiting    On exam: awake, alert  Breathing comfortably on room air  Abd is soft, not distended, tender around the umbilicus and lower abdomen  No rebound, no guarding    Leukocytosis improving  Tbili normalized    - no emergent surgical intervention required at this time  - follow up GI regarding possible mid CBD stricture  - can follow with surg onc as outpatient for retroperitoneal lipoma/liposarcoma

## 2022-05-04 NOTE — CONSULT NOTE ADULT - PROBLEM SELECTOR RECOMMENDATION 9
*Advise EUS/ERCP outpatient; unlikely that ?stricture is source of current abdominal discomfort  -Send tumor markers: CA 19-9, CEA, , AFP  -Trend LFTs

## 2022-05-04 NOTE — PROGRESS NOTE ADULT - NS ATTEND OPT1 GEN_ALL_CORE
I independently performed the documented:
I independently performed the documented:
I attest my time as attending is greater than 50% of the total combined time spent on qualifying patient care activities by the PA/NP and attending.

## 2022-05-04 NOTE — CONSULT NOTE ADULT - ASSESSMENT
HEPATOBILIARY CONSULT:      HEPATOBILIARY CONSULT:     42 obese F presents with abdominal pain, diarrhea.   VSS  Mild elevation LFTs on presentation, now normal   CT: fatty lesion retroperitoneum ~9 cm ?lipoma; 4.7 cm hepatic lobe lesion   MRCP: terminal ileum thick walled, prominent small bowel loop ; CBD 8 mm with abrupt transition in mid CBD, may be due to stricture (malignant stricture cannot be rulted out), no CBD stone.     Possible stricture in CBD with normal LFTs. Unlikely to be source of abdominal discomfort. Advise further evaluation with EUS/ERCP which can be performed as outpatient. Consider MRI with contrast for further evaluation of above.

## 2022-05-04 NOTE — PROGRESS NOTE ADULT - SUBJECTIVE AND OBJECTIVE BOX
Patient seen and examined in chair with Dr. Stahl.  C/o persistent epigastric pain, controlled with narcotics.  Otherwise, without complaints.    Vital Signs Last 24 Hrs  T(F): 98.6 (05-04-22 @ 04:30), Max: 98.6 (05-04-22 @ 04:30)  HR: 97 (05-04-22 @ 04:30)  BP: 135/92 (05-04-22 @ 04:30)  RR: 18 (05-04-22 @ 04:30)  SpO2: 97% (05-04-22 @ 04:30)    GENERAL: Alert, oriented, NAD  CHEST/LUNG: Respirations nonlabored  HEART: S1S2, RRRR  ABDOMEN: Soft, obese, nondistended, mildly TTP epigastric area   EXTREMITIES: No pedal edema b/l     LABS:                        8.4    11.91 )-----------( 323      ( 04 May 2022 06:45 )             27.3     05-04    138  |  106  |  7   ----------------------------<  106<H>  3.6   |  26  |  0.62    Ca    8.5      04 May 2022 06:45  Phos  2.9     05-03  Mg     2.1     05-03    TPro  6.1  /  Alb  2.2<L>  /  TBili  0.4  /  DBili  x   /  AST  15  /  ALT  23  /  AlkPhos  111  05-04    A/P: 42F PMH HTN, DM, with RP mass, a/w dilated CBD, elevated t bili (resolved), found to have inflammatory changes to RLQ on MRCP 5/2 with 8mm CBD and poss malignant stricture   -- no emergent surgical intervention at this time  -- GI for EGD/ERCP  -- OP surg/onc f/u with Dr. Issa Barillas  -- pain meds prn  -- continue medical management and supportive care      Patient seen and examined in chair with Dr. Stahl.  C/o persistent epigastric pain, controlled with narcotics.  Otherwise, without complaints.    Vital Signs Last 24 Hrs  T(F): 98.6 (05-04-22 @ 04:30), Max: 98.6 (05-04-22 @ 04:30)  HR: 97 (05-04-22 @ 04:30)  BP: 135/92 (05-04-22 @ 04:30)  RR: 18 (05-04-22 @ 04:30)  SpO2: 97% (05-04-22 @ 04:30)    GENERAL: Alert, oriented, NAD  CHEST/LUNG: Respirations nonlabored  HEART: S1S2, RRRR  ABDOMEN: Soft, obese, nondistended, mildly TTP epigastric area   EXTREMITIES: No pedal edema b/l     LABS:                        8.4    11.91 )-----------( 323      ( 04 May 2022 06:45 )             27.3     05-04    138  |  106  |  7   ----------------------------<  106<H>  3.6   |  26  |  0.62    Ca    8.5      04 May 2022 06:45  Phos  2.9     05-03  Mg     2.1     05-03    TPro  6.1  /  Alb  2.2<L>  /  TBili  0.4  /  DBili  x   /  AST  15  /  ALT  23  /  AlkPhos  111  05-04    A/P: 42F PMH HTN, DM, with RP mass, a/w dilated CBD, elevated t bili (resolved), found to have inflammatory changes to RLQ on MRCP 5/2 with 8mm CBD and poss malignant stricture   -- no emergent surgical intervention at this time  -- GI for EGD/ERCP  -- OP surg/onc f/u with Dr. Issa Barillas for RP mass (lipoma vs liposarcoma)  -- pain meds prn  -- continue medical management and supportive care

## 2022-05-05 ENCOUNTER — RESULT REVIEW (OUTPATIENT)
Age: 42
End: 2022-05-05

## 2022-05-05 LAB
AFP-TM SERPL-MCNC: <1.8 NG/ML — SIGNIFICANT CHANGE UP
ALBUMIN SERPL ELPH-MCNC: 2 G/DL — LOW (ref 3.3–5)
ALP SERPL-CCNC: 101 U/L — SIGNIFICANT CHANGE UP (ref 40–120)
ALT FLD-CCNC: 20 U/L — SIGNIFICANT CHANGE UP (ref 12–78)
ANION GAP SERPL CALC-SCNC: 7 MMOL/L — SIGNIFICANT CHANGE UP (ref 5–17)
AST SERPL-CCNC: 9 U/L — LOW (ref 15–37)
BILIRUB SERPL-MCNC: 0.2 MG/DL — SIGNIFICANT CHANGE UP (ref 0.2–1.2)
BUN SERPL-MCNC: 6 MG/DL — LOW (ref 7–23)
CALCIUM SERPL-MCNC: 8.2 MG/DL — LOW (ref 8.5–10.1)
CANCER AG125 SERPL-ACNC: 18 U/ML — SIGNIFICANT CHANGE UP
CANCER AG19-9 SERPL-ACNC: 16 U/ML — SIGNIFICANT CHANGE UP
CEA SERPL-MCNC: <0.6 NG/ML — SIGNIFICANT CHANGE UP (ref 0–3.8)
CHLORIDE SERPL-SCNC: 105 MMOL/L — SIGNIFICANT CHANGE UP (ref 96–108)
CO2 SERPL-SCNC: 28 MMOL/L — SIGNIFICANT CHANGE UP (ref 22–31)
CREAT SERPL-MCNC: 0.68 MG/DL — SIGNIFICANT CHANGE UP (ref 0.5–1.3)
EGFR: 111 ML/MIN/1.73M2 — SIGNIFICANT CHANGE UP
FLUAV AG NPH QL: SIGNIFICANT CHANGE UP
FLUBV AG NPH QL: SIGNIFICANT CHANGE UP
GLUCOSE BLDC GLUCOMTR-MCNC: 20 MG/DL — CRITICAL LOW (ref 70–99)
GLUCOSE BLDC GLUCOMTR-MCNC: 74 MG/DL — SIGNIFICANT CHANGE UP (ref 70–99)
GLUCOSE BLDC GLUCOMTR-MCNC: 76 MG/DL — SIGNIFICANT CHANGE UP (ref 70–99)
GLUCOSE BLDC GLUCOMTR-MCNC: 86 MG/DL — SIGNIFICANT CHANGE UP (ref 70–99)
GLUCOSE BLDC GLUCOMTR-MCNC: 99 MG/DL — SIGNIFICANT CHANGE UP (ref 70–99)
GLUCOSE SERPL-MCNC: 75 MG/DL — SIGNIFICANT CHANGE UP (ref 70–99)
HCG UR QL: NEGATIVE — SIGNIFICANT CHANGE UP
HCT VFR BLD CALC: 25.2 % — LOW (ref 34.5–45)
HGB BLD-MCNC: 7.7 G/DL — LOW (ref 11.5–15.5)
MCHC RBC-ENTMCNC: 24.3 PG — LOW (ref 27–34)
MCHC RBC-ENTMCNC: 30.6 G/DL — LOW (ref 32–36)
MCV RBC AUTO: 79.5 FL — LOW (ref 80–100)
NRBC # BLD: 0 /100 WBCS — SIGNIFICANT CHANGE UP (ref 0–0)
OB PNL STL: NEGATIVE — SIGNIFICANT CHANGE UP
PLATELET # BLD AUTO: 329 K/UL — SIGNIFICANT CHANGE UP (ref 150–400)
POTASSIUM SERPL-MCNC: 3.4 MMOL/L — LOW (ref 3.5–5.3)
POTASSIUM SERPL-SCNC: 3.4 MMOL/L — LOW (ref 3.5–5.3)
PROT SERPL-MCNC: 5.9 GM/DL — LOW (ref 6–8.3)
RBC # BLD: 3.17 M/UL — LOW (ref 3.8–5.2)
RBC # FLD: 16.9 % — HIGH (ref 10.3–14.5)
SARS-COV-2 RNA SPEC QL NAA+PROBE: SIGNIFICANT CHANGE UP
SODIUM SERPL-SCNC: 140 MMOL/L — SIGNIFICANT CHANGE UP (ref 135–145)
WBC # BLD: 7.57 K/UL — SIGNIFICANT CHANGE UP (ref 3.8–10.5)
WBC # FLD AUTO: 7.57 K/UL — SIGNIFICANT CHANGE UP (ref 3.8–10.5)

## 2022-05-05 PROCEDURE — 88312 SPECIAL STAINS GROUP 1: CPT | Mod: 26

## 2022-05-05 PROCEDURE — 88305 TISSUE EXAM BY PATHOLOGIST: CPT | Mod: 26

## 2022-05-05 RX ORDER — POTASSIUM CHLORIDE 20 MEQ
40 PACKET (EA) ORAL EVERY 4 HOURS
Refills: 0 | Status: COMPLETED | OUTPATIENT
Start: 2022-05-05 | End: 2022-05-05

## 2022-05-05 RX ORDER — ONDANSETRON 8 MG/1
4 TABLET, FILM COATED ORAL ONCE
Refills: 0 | Status: DISCONTINUED | OUTPATIENT
Start: 2022-05-05 | End: 2022-05-05

## 2022-05-05 RX ORDER — POTASSIUM CHLORIDE 20 MEQ
10 PACKET (EA) ORAL
Refills: 0 | Status: COMPLETED | OUTPATIENT
Start: 2022-05-05 | End: 2022-05-05

## 2022-05-05 RX ORDER — SODIUM CHLORIDE 9 MG/ML
1000 INJECTION, SOLUTION INTRAVENOUS
Refills: 0 | Status: DISCONTINUED | OUTPATIENT
Start: 2022-05-05 | End: 2022-05-05

## 2022-05-05 RX ADMIN — HEPARIN SODIUM 5000 UNIT(S): 5000 INJECTION INTRAVENOUS; SUBCUTANEOUS at 06:16

## 2022-05-05 RX ADMIN — Medication 1 GRAM(S): at 06:17

## 2022-05-05 RX ADMIN — PIPERACILLIN AND TAZOBACTAM 25 GRAM(S): 4; .5 INJECTION, POWDER, LYOPHILIZED, FOR SOLUTION INTRAVENOUS at 07:07

## 2022-05-05 RX ADMIN — PIPERACILLIN AND TAZOBACTAM 25 GRAM(S): 4; .5 INJECTION, POWDER, LYOPHILIZED, FOR SOLUTION INTRAVENOUS at 17:17

## 2022-05-05 RX ADMIN — PANTOPRAZOLE SODIUM 40 MILLIGRAM(S): 20 TABLET, DELAYED RELEASE ORAL at 17:17

## 2022-05-05 RX ADMIN — Medication 1 GRAM(S): at 11:12

## 2022-05-05 RX ADMIN — PIPERACILLIN AND TAZOBACTAM 25 GRAM(S): 4; .5 INJECTION, POWDER, LYOPHILIZED, FOR SOLUTION INTRAVENOUS at 23:08

## 2022-05-05 RX ADMIN — PANTOPRAZOLE SODIUM 40 MILLIGRAM(S): 20 TABLET, DELAYED RELEASE ORAL at 06:17

## 2022-05-05 RX ADMIN — Medication 1 GRAM(S): at 23:08

## 2022-05-05 RX ADMIN — DEXTROSE MONOHYDRATE, SODIUM CHLORIDE, AND POTASSIUM CHLORIDE 125 MILLILITER(S): 50; .745; 4.5 INJECTION, SOLUTION INTRAVENOUS at 21:56

## 2022-05-05 RX ADMIN — Medication 40 MILLIEQUIVALENT(S): at 09:29

## 2022-05-05 RX ADMIN — Medication 1 GRAM(S): at 17:16

## 2022-05-05 RX ADMIN — Medication 40 MILLIEQUIVALENT(S): at 13:27

## 2022-05-05 RX ADMIN — Medication 1 PATCH: at 11:02

## 2022-05-05 RX ADMIN — DEXTROSE MONOHYDRATE, SODIUM CHLORIDE, AND POTASSIUM CHLORIDE 125 MILLILITER(S): 50; .745; 4.5 INJECTION, SOLUTION INTRAVENOUS at 06:17

## 2022-05-05 NOTE — DIETITIAN INITIAL EVALUATION ADULT - PERTINENT MEDS FT
MEDICATIONS  (STANDING):  cloNIDine Patch 0.1 mG/24Hr(s) 1 patch Transdermal every 7 days  dextrose 5%. 1000 milliLiter(s) (100 mL/Hr) IV Continuous <Continuous>  dextrose 5%. 1000 milliLiter(s) (50 mL/Hr) IV Continuous <Continuous>  dextrose 50% Injectable 25 Gram(s) IV Push once  dextrose 50% Injectable 12.5 Gram(s) IV Push once  dextrose 50% Injectable 25 Gram(s) IV Push once  glucagon  Injectable 1 milliGRAM(s) IntraMuscular once  heparin   Injectable 5000 Unit(s) SubCutaneous every 8 hours  insulin lispro (ADMELOG) corrective regimen sliding scale   SubCutaneous at bedtime  insulin lispro (ADMELOG) corrective regimen sliding scale   SubCutaneous three times a day before meals  pantoprazole    Tablet 40 milliGRAM(s) Oral two times a day  piperacillin/tazobactam IVPB.. 3.375 Gram(s) IV Intermittent every 8 hours  potassium chloride   Powder 40 milliEquivalent(s) Oral every 4 hours  potassium chloride  10 mEq/100 mL IVPB 10 milliEquivalent(s) IV Intermittent every 1 hour  sodium chloride 0.9% with potassium chloride 20 mEq/L 1000 milliLiter(s) (125 mL/Hr) IV Continuous <Continuous>  sucralfate suspension 1 Gram(s) Oral four times a day    MEDICATIONS  (PRN):  acetaminophen     Tablet .. 650 milliGRAM(s) Oral every 6 hours PRN Temp greater or equal to 38C (100.4F), Mild Pain (1 - 3), Moderate Pain (4 - 6)  dextrose Oral Gel 15 Gram(s) Oral once PRN Blood Glucose LESS THAN 70 milliGRAM(s)/deciliter  morphine  - Injectable 2 milliGRAM(s) IV Push every 4 hours PRN Moderate Pain (4 - 6)  morphine  - Injectable 4 milliGRAM(s) IV Push every 6 hours PRN Severe Pain (7 - 10)  ondansetron Injectable 4 milliGRAM(s) IV Push every 6 hours PRN Nausea and/or Vomiting

## 2022-05-05 NOTE — PROGRESS NOTE ADULT - SUBJECTIVE AND OBJECTIVE BOX
See gastroscopy report    Imp: Gastritis; Hiatal hernia (small)    Plan: check path; protonix - can't explain anemia on basis of endoscopy. As per Dr Alvarenga - might need outpat EUS and ERCP. Pt has an old retroperitoneal mass. Continue zosyn for now.      See gastroscopy report    Imp: Gastritis; Hiatal hernia (small)    Plan: check path; protonix - can't explain anemia on basis of endoscopy. As per Dr Alvarenga - might need outpat EUS and ERCP. Pt has an old retroperitoneal mass. Continue zosyn for now. (tumor markers are negative)

## 2022-05-05 NOTE — PROGRESS NOTE ADULT - SUBJECTIVE AND OBJECTIVE BOX
INTERVAL HPI/OVERNIGHT EVENTS:        REVIEW OF SYSTEMS:  CONSTITUTIONAL:  pain  persists  but  much  improved    NECK: No pain or stiffnes  RESPIRATORY: No SOB   CARDIOVASCULAR: No chest pain, palpitations, dizziness,   GASTROINTESTINAL: abdominal pain. No nausea, vomiting,   NEUROLOGICAL: No headaches, no  blurry  vision no  dizziness  SKIN: No itching,   MUSCULOSKELETAL: No pain    MEDICATION:  acetaminophen     Tablet .. 650 milliGRAM(s) Oral every 6 hours PRN  cloNIDine Patch 0.1 mG/24Hr(s) 1 patch Transdermal every 7 days  dextrose 5%. 1000 milliLiter(s) IV Continuous <Continuous>  dextrose 5%. 1000 milliLiter(s) IV Continuous <Continuous>  dextrose 50% Injectable 25 Gram(s) IV Push once  dextrose 50% Injectable 12.5 Gram(s) IV Push once  dextrose 50% Injectable 25 Gram(s) IV Push once  dextrose Oral Gel 15 Gram(s) Oral once PRN  glucagon  Injectable 1 milliGRAM(s) IntraMuscular once  heparin   Injectable 5000 Unit(s) SubCutaneous every 8 hours  insulin lispro (ADMELOG) corrective regimen sliding scale   SubCutaneous at bedtime  insulin lispro (ADMELOG) corrective regimen sliding scale   SubCutaneous three times a day before meals  morphine  - Injectable 2 milliGRAM(s) IV Push every 4 hours PRN  morphine  - Injectable 4 milliGRAM(s) IV Push every 6 hours PRN  ondansetron Injectable 4 milliGRAM(s) IV Push every 6 hours PRN  pantoprazole    Tablet 40 milliGRAM(s) Oral two times a day  piperacillin/tazobactam IVPB.. 3.375 Gram(s) IV Intermittent every 8 hours  sodium chloride 0.9% with potassium chloride 20 mEq/L 1000 milliLiter(s) IV Continuous <Continuous>  sucralfate suspension 1 Gram(s) Oral four times a day    Vital Signs Last 24 Hrs  T(C): 36.6 (05 May 2022 04:49), Max: 36.8 (04 May 2022 15:30)  T(F): 97.9 (05 May 2022 04:49), Max: 98.3 (04 May 2022 15:30)  HR: 81 (05 May 2022 04:49) (81 - 97)  BP: 139/95 (05 May 2022 04:49) (131/90 - 139/95)  BP(mean): --  RR: 18 (05 May 2022 04:49) (18 - 18)  SpO2: 98% (05 May 2022 04:49) (97% - 100%)    PHYSICAL EXAM:  GENERAL: NAD, well-groomed, well-developed  HEENT : Conjuntivae  clear sclerae anicteric  NECK: Supple, No JVD, Normal thyroid  NERVOUS SYSTEM:  Alert oriented   no  focal  deficits;   CHEST/LUNG: Clear    HEART: Regular rate and rhythm; No murmurs, rubs, or gallops  ABDOMEN: Soft, Nontender, Nondistended; Bowel sounds present  EXTREMITIES:  no  edema no  tenderness  SKIN: No rashes   LABS:                        7.7    7.57  )-----------( 329      ( 05 May 2022 06:27 )             25.2     05-05    140  |  105  |  6<L>  ----------------------------<  75  3.4<L>   |  28  |  0.68    Ca    8.2<L>      05 May 2022 06:27    TPro  5.9<L>  /  Alb  2.0<L>  /  TBili  0.2  /  DBili  x   /  AST  9<L>  /  ALT  20  /  AlkPhos  101  05-05        CAPILLARY BLOOD GLUCOSE      POCT Blood Glucose.: 86 mg/dL (05 May 2022 08:01)  POCT Blood Glucose.: 82 mg/dL (04 May 2022 21:16)      RADIOLOGY & ADDITIONAL TESTS:    Imaging reports  Personally Reviewed:  [ ] YES  [ ] NO    Consultant(s) Notes Reviewed:  [ x] YES  [ ] NO    Care Discussed with Consultants/Other Providers [x ] YES  [ ] NO  Problem/Plan - 1:  ·  Problem: Abdominal pain.   ·  Plan: morphine  prn.     Problem/Plan - 2:  ·  Problem: Dilated bile duct.   ·  Plan: zosyn pain management   discussed  with  Dr Alvarenga  states  in Pt  ERCP  not  necessary  as  LFTs  are  normal  may need   EUS and ERCP as  ouit  patient which would  be  done  at  [facility]  or  [facility]  discussed  with  Dr Small  for  endoscopy  today    furhter w/u  and  Rx  as per  clinical  course  Problem/Plan - 3:  ·  Problem: Hypokalemia.   ·  Plan: supplement.    Problem/Plan - 4:  ·  Problem: DM (diabetes mellitus).   ·  Plan: insulin coverage.    Problem/Plan - 5:  ·  Problem: HTN (hypertension).   ·  Plan: clinidine patch.

## 2022-05-05 NOTE — DIETITIAN INITIAL EVALUATION ADULT - OTHER INFO
Pt seen on medical floor w abd pain ( on morphine prn) ; Dilated bile duct ; DM2 ; ( insulin coverage ; HTN ; No plan for ERCP at this point. Denies N/V/D/C/Chewing/Swallowing issues, No food allergies. Reports feeling Hungry. Discussed transition of diet from clear liquids. Pt voiced understanding.

## 2022-05-05 NOTE — DIETITIAN INITIAL EVALUATION ADULT - ORAL INTAKE PTA/DIET HISTORY
Pt was following a low sodium , diabetic diet PTA. She had stopped taking metformin in November and began taking ozempic . Reports wt loss of 40 # since November dieting, meeting w/ cardiologist , Dietitian and getting weighed 1x/ week.  Pt reports she would like to be weighed on a stand up scale. She feels the bed is inaccurate.

## 2022-05-05 NOTE — DIETITIAN INITIAL EVALUATION ADULT - PERTINENT LABORATORY DATA
05-05    140  |  105  |  6<L>  ----------------------------<  75  3.4<L>   |  28  |  0.68    Ca    8.2<L>      05 May 2022 06:27    TPro  5.9<L>  /  Alb  2.0<L>  /  TBili  0.2  /  DBili  x   /  AST  9<L>  /  ALT  20  /  AlkPhos  101  05-05  POCT Blood Glucose.: 86 mg/dL (05-05-22 @ 08:01)  A1C with Estimated Average Glucose Result: 5.8 % (05-02-22 @ 08:55)

## 2022-05-06 ENCOUNTER — TRANSCRIPTION ENCOUNTER (OUTPATIENT)
Age: 42
End: 2022-05-06

## 2022-05-06 VITALS
SYSTOLIC BLOOD PRESSURE: 97 MMHG | RESPIRATION RATE: 18 BRPM | HEART RATE: 57 BPM | DIASTOLIC BLOOD PRESSURE: 58 MMHG | TEMPERATURE: 98 F | OXYGEN SATURATION: 99 %

## 2022-05-06 LAB
ALBUMIN SERPL ELPH-MCNC: 2.2 G/DL — LOW (ref 3.3–5)
ALP SERPL-CCNC: 96 U/L — SIGNIFICANT CHANGE UP (ref 40–120)
ALT FLD-CCNC: 19 U/L — SIGNIFICANT CHANGE UP (ref 12–78)
ANION GAP SERPL CALC-SCNC: 6 MMOL/L — SIGNIFICANT CHANGE UP (ref 5–17)
AST SERPL-CCNC: 11 U/L — LOW (ref 15–37)
BILIRUB SERPL-MCNC: 0.3 MG/DL — SIGNIFICANT CHANGE UP (ref 0.2–1.2)
BUN SERPL-MCNC: 4 MG/DL — LOW (ref 7–23)
CALCIUM SERPL-MCNC: 9 MG/DL — SIGNIFICANT CHANGE UP (ref 8.5–10.1)
CHLORIDE SERPL-SCNC: 104 MMOL/L — SIGNIFICANT CHANGE UP (ref 96–108)
CO2 SERPL-SCNC: 28 MMOL/L — SIGNIFICANT CHANGE UP (ref 22–31)
CREAT SERPL-MCNC: 0.66 MG/DL — SIGNIFICANT CHANGE UP (ref 0.5–1.3)
CULTURE RESULTS: SIGNIFICANT CHANGE UP
CULTURE RESULTS: SIGNIFICANT CHANGE UP
EGFR: 112 ML/MIN/1.73M2 — SIGNIFICANT CHANGE UP
FOLATE SERPL-MCNC: 6.1 NG/ML — SIGNIFICANT CHANGE UP
GLUCOSE BLDC GLUCOMTR-MCNC: 91 MG/DL — SIGNIFICANT CHANGE UP (ref 70–99)
GLUCOSE SERPL-MCNC: 79 MG/DL — SIGNIFICANT CHANGE UP (ref 70–99)
HCT VFR BLD CALC: 26.9 % — LOW (ref 34.5–45)
HGB BLD-MCNC: 8.3 G/DL — LOW (ref 11.5–15.5)
IRON SATN MFR SERPL: 11 % — LOW (ref 14–50)
IRON SATN MFR SERPL: 25 UG/DL — LOW (ref 30–160)
MCHC RBC-ENTMCNC: 24.4 PG — LOW (ref 27–34)
MCHC RBC-ENTMCNC: 30.9 G/DL — LOW (ref 32–36)
MCV RBC AUTO: 79.1 FL — LOW (ref 80–100)
NRBC # BLD: 0 /100 WBCS — SIGNIFICANT CHANGE UP (ref 0–0)
PLATELET # BLD AUTO: 374 K/UL — SIGNIFICANT CHANGE UP (ref 150–400)
POTASSIUM SERPL-MCNC: 3.8 MMOL/L — SIGNIFICANT CHANGE UP (ref 3.5–5.3)
POTASSIUM SERPL-SCNC: 3.8 MMOL/L — SIGNIFICANT CHANGE UP (ref 3.5–5.3)
PROT SERPL-MCNC: 6.3 GM/DL — SIGNIFICANT CHANGE UP (ref 6–8.3)
RBC # BLD: 3.4 M/UL — LOW (ref 3.8–5.2)
RBC # FLD: 16.8 % — HIGH (ref 10.3–14.5)
SODIUM SERPL-SCNC: 138 MMOL/L — SIGNIFICANT CHANGE UP (ref 135–145)
SPECIMEN SOURCE: SIGNIFICANT CHANGE UP
SPECIMEN SOURCE: SIGNIFICANT CHANGE UP
TIBC SERPL-MCNC: 235 UG/DL — SIGNIFICANT CHANGE UP (ref 220–430)
UIBC SERPL-MCNC: 209 UG/DL — SIGNIFICANT CHANGE UP (ref 110–370)
VIT B12 SERPL-MCNC: 1514 PG/ML — HIGH (ref 232–1245)
WBC # BLD: 6.04 K/UL — SIGNIFICANT CHANGE UP (ref 3.8–10.5)
WBC # FLD AUTO: 6.04 K/UL — SIGNIFICANT CHANGE UP (ref 3.8–10.5)

## 2022-05-06 PROCEDURE — 99231 SBSQ HOSP IP/OBS SF/LOW 25: CPT

## 2022-05-06 RX ORDER — PANTOPRAZOLE SODIUM 20 MG/1
1 TABLET, DELAYED RELEASE ORAL
Qty: 30 | Refills: 0
Start: 2022-05-06 | End: 2022-06-04

## 2022-05-06 RX ORDER — LISINOPRIL 2.5 MG/1
1 TABLET ORAL
Qty: 0 | Refills: 0 | DISCHARGE

## 2022-05-06 RX ORDER — PANTOPRAZOLE SODIUM 20 MG/1
1 TABLET, DELAYED RELEASE ORAL
Qty: 8 | Refills: 0
Start: 2022-05-06 | End: 2022-05-09

## 2022-05-06 RX ORDER — BENZOCAINE AND MENTHOL 5; 1 G/100ML; G/100ML
1 LIQUID ORAL EVERY 4 HOURS
Refills: 0 | Status: DISCONTINUED | OUTPATIENT
Start: 2022-05-06 | End: 2022-05-06

## 2022-05-06 RX ADMIN — PIPERACILLIN AND TAZOBACTAM 25 GRAM(S): 4; .5 INJECTION, POWDER, LYOPHILIZED, FOR SOLUTION INTRAVENOUS at 06:37

## 2022-05-06 RX ADMIN — Medication 1 GRAM(S): at 06:20

## 2022-05-06 RX ADMIN — DEXTROSE MONOHYDRATE, SODIUM CHLORIDE, AND POTASSIUM CHLORIDE 125 MILLILITER(S): 50; .745; 4.5 INJECTION, SOLUTION INTRAVENOUS at 05:08

## 2022-05-06 RX ADMIN — PANTOPRAZOLE SODIUM 40 MILLIGRAM(S): 20 TABLET, DELAYED RELEASE ORAL at 06:20

## 2022-05-06 NOTE — PROGRESS NOTE ADULT - REASON FOR ADMISSION
adominal pain  hypokalemia dilated CBD
abdominal pain  hypokalemia dilated CBD
adominal pain  hypokalemia dilated CBD

## 2022-05-06 NOTE — PROGRESS NOTE ADULT - PROVIDER SPECIALTY LIST ADULT
Gastroenterology
Internal Medicine
Internal Medicine
Surgery
Gastroenterology
Gastroenterology
Internal Medicine
Surgery
Gastroenterology
Gastroenterology
Internal Medicine
Internal Medicine
Surgery
Surgery

## 2022-05-06 NOTE — PROGRESS NOTE ADULT - SUBJECTIVE AND OBJECTIVE BOX
INTERVAL HPI/OVERNIGHT EVENTS:        REVIEW OF SYSTEMS:  CONSTITUTIONAL: feels  well presents  no  complaints    NECK: No pain or stiffnes  RESPIRATORY: No SOB   CARDIOVASCULAR: No chest pain, palpitations, dizziness,   GASTROINTESTINAL: No abdominal pain. No nausea, vomiting,   NEUROLOGICAL: No headaches, no  blurry  vision no  dizziness  SKIN: No itching,   MUSCULOSKELETAL: No pain    MEDICATION:  acetaminophen     Tablet .. 650 milliGRAM(s) Oral every 6 hours PRN  benzocaine 15 mG/menthol 3.6 mG Lozenge 1 Lozenge Oral every 4 hours PRN  cloNIDine Patch 0.1 mG/24Hr(s) 1 patch Transdermal every 7 days  dextrose 5%. 1000 milliLiter(s) IV Continuous <Continuous>  dextrose 5%. 1000 milliLiter(s) IV Continuous <Continuous>  dextrose 50% Injectable 25 Gram(s) IV Push once  dextrose 50% Injectable 12.5 Gram(s) IV Push once  dextrose 50% Injectable 25 Gram(s) IV Push once  dextrose Oral Gel 15 Gram(s) Oral once PRN  glucagon  Injectable 1 milliGRAM(s) IntraMuscular once  heparin   Injectable 5000 Unit(s) SubCutaneous every 8 hours  insulin lispro (ADMELOG) corrective regimen sliding scale   SubCutaneous at bedtime  insulin lispro (ADMELOG) corrective regimen sliding scale   SubCutaneous three times a day before meals  morphine  - Injectable 2 milliGRAM(s) IV Push every 4 hours PRN  morphine  - Injectable 4 milliGRAM(s) IV Push every 6 hours PRN  ondansetron Injectable 4 milliGRAM(s) IV Push every 6 hours PRN  pantoprazole    Tablet 40 milliGRAM(s) Oral two times a day  piperacillin/tazobactam IVPB.. 3.375 Gram(s) IV Intermittent every 8 hours  sodium chloride 0.9% with potassium chloride 20 mEq/L 1000 milliLiter(s) IV Continuous <Continuous>  sucralfate suspension 1 Gram(s) Oral four times a day    Vital Signs Last 24 Hrs  T(C): 36.6 (06 May 2022 04:35), Max: 37.1 (05 May 2022 17:40)  T(F): 97.9 (06 May 2022 04:35), Max: 98.7 (05 May 2022 17:40)  HR: 75 (06 May 2022 04:35) (75 - 91)  BP: 154/98 (06 May 2022 04:35) (128/87 - 154/98)  BP(mean): --  RR: 18 (06 May 2022 04:35) (18 - 29)  SpO2: 100% (06 May 2022 04:35) (97% - 100%)    PHYSICAL EXAM:  GENERAL: NAD, well-groomed, well-developed  HEENT : Conjuntivae  clear sclerae anicteric  NECK: Supple, No JVD, Normal thyroid  NERVOUS SYSTEM:  Alert oriented   no  focal  deficits;   CHEST/LUNG: Clear    HEART: Regular rate and rhythm; No murmurs, rubs, or gallops  ABDOMEN: Soft, Nontender, Nondistended; Bowel sounds present  EXTREMITIES:  no  edema no  tenderness  SKIN: No rashes   LABS:                        8.3    6.04  )-----------( 374      ( 06 May 2022 07:31 )             26.9     05-06    138  |  104  |  4<L>  ----------------------------<  79  3.8   |  28  |  0.66    Ca    9.0      06 May 2022 07:31    TPro  6.3  /  Alb  2.2<L>  /  TBili  0.3  /  DBili  x   /  AST  11<L>  /  ALT  19  /  AlkPhos  96  05-06        CAPILLARY BLOOD GLUCOSE      POCT Blood Glucose.: 91 mg/dL (06 May 2022 07:44)  POCT Blood Glucose.: 76 mg/dL (05 May 2022 21:49)  POCT Blood Glucose.: 74 mg/dL (05 May 2022 17:06)  POCT Blood Glucose.: 99 mg/dL (05 May 2022 11:33)  POCT Blood Glucose.: 20 mg/dL (05 May 2022 11:31)      RADIOLOGY & ADDITIONAL TESTS:    Imaging reports  Personally Reviewed:  [ ] YES  [ ] NO    Consultant(s) Notes Reviewed:  [x ] YES  [ ] NO    Care Discussed with Consultants/Other Providers [x ] YES  [ ] NO  Problem/Plan - 1:  ·  Problem: Abdominal pain.   ·  Plan: resolved    gastritits  patoprazole    Problem/Plan - 2:  ·  Problem: Dilated bile duct.   ·  discussed  with  Dr Alvarenga  states  in Pt  ERCP  not  necessary  as  LFTs  are  normal  may need   EUS and ERCP as  ouit  patient which would  be  done  at  [facility]  or  [facility]  discussed  with  Dr Small    further  w/u  as  out  patient      discharge  home  finish 10  day  course  of  AB  empirically    Problem/Plan - 3:  ·  Problem: Hypokalemia.   ·  Plan: supplement.    Problem/Plan - 4:  ·  Problem: DM (diabetes mellitus).   ·  Plan:   restart  metformin    Problem/Plan - 5:  ·  Problem: HTN (hypertension).   ·  Plan: restart  procardia  anemia  no  clinical  evidence  of  GI  bleed  continue  to  monitor  fe  supplementation

## 2022-05-06 NOTE — DISCHARGE NOTE PROVIDER - PROVIDER TOKENS
PROVIDER:[TOKEN:[[id number]]],FREE:[LAST:[PMD],PHONE:[(   )    -],FAX:[(   )    -]],PROVIDER:[TOKEN:[[id number]]]

## 2022-05-06 NOTE — PROGRESS NOTE ADULT - NUTRITIONAL ASSESSMENT
This patient has been assessed with a concern for Malnutrition and has been determined to have a diagnosis/diagnoses of Morbid obesity (BMI > 40).    This patient is being managed with:   Diet Consistent Carbohydrate/No Snacks-  Entered: May  6 2022  7:24AM

## 2022-05-06 NOTE — DISCHARGE NOTE PROVIDER - DETAILS OF MALNUTRITION DIAGNOSIS/DIAGNOSES
This patient has been assessed with a concern for Malnutrition and was treated during this hospitalization for the following Nutrition diagnosis/diagnoses:     -  05/05/2022: Morbid obesity (BMI > 40)

## 2022-05-06 NOTE — PROGRESS NOTE ADULT - SUBJECTIVE AND OBJECTIVE BOX
Pt stable - feeling better  Being discharged home on Augmentin      MEDICATIONS  (STANDING):  cloNIDine Patch 0.1 mG/24Hr(s) 1 patch Transdermal every 7 days  dextrose 5%. 1000 milliLiter(s) (100 mL/Hr) IV Continuous <Continuous>  dextrose 5%. 1000 milliLiter(s) (50 mL/Hr) IV Continuous <Continuous>  dextrose 50% Injectable 25 Gram(s) IV Push once  dextrose 50% Injectable 12.5 Gram(s) IV Push once  dextrose 50% Injectable 25 Gram(s) IV Push once  glucagon  Injectable 1 milliGRAM(s) IntraMuscular once  heparin   Injectable 5000 Unit(s) SubCutaneous every 8 hours  insulin lispro (ADMELOG) corrective regimen sliding scale   SubCutaneous at bedtime  insulin lispro (ADMELOG) corrective regimen sliding scale   SubCutaneous three times a day before meals  pantoprazole    Tablet 40 milliGRAM(s) Oral two times a day  piperacillin/tazobactam IVPB.. 3.375 Gram(s) IV Intermittent every 8 hours  sucralfate suspension 1 Gram(s) Oral four times a day    MEDICATIONS  (PRN):  acetaminophen     Tablet .. 650 milliGRAM(s) Oral every 6 hours PRN Temp greater or equal to 38C (100.4F), Mild Pain (1 - 3), Moderate Pain (4 - 6)  benzocaine 15 mG/menthol 3.6 mG Lozenge 1 Lozenge Oral every 4 hours PRN Sore Throat  dextrose Oral Gel 15 Gram(s) Oral once PRN Blood Glucose LESS THAN 70 milliGRAM(s)/deciliter  morphine  - Injectable 2 milliGRAM(s) IV Push every 4 hours PRN Moderate Pain (4 - 6)  morphine  - Injectable 4 milliGRAM(s) IV Push every 6 hours PRN Severe Pain (7 - 10)  ondansetron Injectable 4 milliGRAM(s) IV Push every 6 hours PRN Nausea and/or Vomiting      Allergies    No Known Allergies    Intolerances        Vital Signs Last 24 Hrs  T(C): 36.6 (06 May 2022 09:56), Max: 37.1 (05 May 2022 17:40)  T(F): 97.9 (06 May 2022 09:56), Max: 98.7 (05 May 2022 17:40)  HR: 77 (06 May 2022 09:56) (75 - 91)  BP: 144/85 (06 May 2022 09:56) (128/87 - 154/98)  BP(mean): --  RR: 18 (06 May 2022 09:56) (18 - 29)  SpO2: 100% (06 May 2022 09:56) (97% - 100%)    PHYSICAL EXAM:  General: NAD.  CVS: S1, S2  Chest: air entry bilaterally present  Abd: BS present, soft, non-tender      LABS:                        8.3    6.04  )-----------( 374      ( 06 May 2022 07:31 )             26.9     05-06    138  |  104  |  4<L>  ----------------------------<  79  3.8   |  28  |  0.66    Ca    9.0      06 May 2022 07:31    TPro  6.3  /  Alb  2.2<L>  /  TBili  0.3  /  DBili  x   /  AST  11<L>  /  ALT  19  /  AlkPhos  96  05-06        Pt to continue course of PO antibx  Pt instructed to f/u with Dr Ruiz at [facility] for possible ERCP and evaluation of CBD

## 2022-05-06 NOTE — DISCHARGE NOTE PROVIDER - NSDCCPCAREPLAN_GEN_ALL_CORE_FT
PRINCIPAL DISCHARGE DIAGNOSIS  Diagnosis: Abdominal pain  Assessment and Plan of Treatment:       SECONDARY DISCHARGE DIAGNOSES  Diagnosis: Dilated bile duct  Assessment and Plan of Treatment:     Diagnosis: DM (diabetes mellitus)  Assessment and Plan of Treatment:     Diagnosis: Hypokalemia  Assessment and Plan of Treatment:     Diagnosis: Anemia  Assessment and Plan of Treatment:     Diagnosis: Gastritis  Assessment and Plan of Treatment:

## 2022-05-06 NOTE — DISCHARGE NOTE PROVIDER - NSDCMRMEDTOKEN_GEN_ALL_CORE_FT
amoxicillin-clavulanate 875 mg-125 mg oral tablet: 1 cap(s) orally every 12 hours   ferrous sulfate 325 mg (65 mg elemental iron) oral tablet: 1 tab(s) orally once a day  metFORMIN 500 mg oral tablet: 1 tab(s) orally once a day  pantoprazole 40 mg oral delayed release tablet: 1 tab(s) orally once a day   Procardia XL 30 mg oral tablet, extended release: 1 tab(s) orally once a day

## 2022-05-06 NOTE — DISCHARGE NOTE NURSING/CASE MANAGEMENT/SOCIAL WORK - DATE OF LAST VACCINATION
SGA (small for gestational age)
10-Mar-2021

## 2022-05-06 NOTE — DISCHARGE NOTE PROVIDER - HOSPITAL COURSE
patient  treated  with ivf  AB  antalgics  with  good  clincal improvement  found  to  have  dilated  CBD  with  stricture on  MRCP evaluated  by  GI x2    underwent  EGD  +  for  gastritis  and  small HH  suggested  ERCP EUS  as  out  patient  at  [facility]  patient' s  pain  resolved appetite  good  ambulating  freely   leucocytosis  normalized  anemia  stable patient    no   nausea  no  vomiting  discharged  home  to  follow  at  [facility] as  out  patient for further  evaluation of  retrohepatic  mass  and  dilated  CBD  to  follow  with pmd  to  call me if  any  further  questions

## 2022-05-06 NOTE — DISCHARGE NOTE PROVIDER - CARE PROVIDER_API CALL
Clifford Ruzi)  Gastroenterology; Internal Medicine  [address] [place] [address], [facility] [address]  [place] [address]  Phone: [phone]  Fax: [phone]  Follow Up Time:     PMD,   Phone: (   )    -  Fax: (   )    -  Follow Up Time:     Issa Barillas)  Surgery  [address] [place] [address]  Phone: [phone]  Fax: [phone]  Follow Up Time:

## 2022-05-06 NOTE — PROGRESS NOTE ADULT - ASSESSMENT
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 Alvarenga  - outpatient follow up with Dr. Issa Barillas for RP lipoma/liposarcoma. [phone]
42F PMH HTN, DM, found to have retroperitoneal mass on last admission, a/w dilated CBD, elevated t bili 2.2, hypoK.  Leukocytosis 10 --> 26, tachycardia. Per GI likely colitis.    - Clear liquid diet/IVF  - IV antibiotics  - Follow up AM labs  - Replete electrolytes as needed  - GI consult appreciated  - F/U AM MRCP to r/o obstructing CBD stone  Will discuss with attending

## 2022-05-06 NOTE — DISCHARGE NOTE PROVIDER - CARE PROVIDERS DIRECT ADDRESSES
[email or web address]

## 2022-05-06 NOTE — DISCHARGE NOTE NURSING/CASE MANAGEMENT/SOCIAL WORK - PATIENT PORTAL LINK FT
You can access the FollowMyHealth Patient Portal offered by [facility] by registering at the following website: [email or web address]. By joining [employer]’s FollowMyHealth portal, you will also be able to view your health information using other applications (apps) compatible with our system.

## 2022-05-06 NOTE — PROGRESS NOTE ADULT - SUBJECTIVE AND OBJECTIVE BOX
Ms. Mora is comfortable in bed  No nausea, vomiting, fever or chills  No abdominal pain    ICU Vital Signs Last 24 Hrs  T(C): 36.6 (06 May 2022 04:35), Max: 37.1 (05 May 2022 17:40)  T(F): 97.9 (06 May 2022 04:35), Max: 98.7 (05 May 2022 17:40)  HR: 75 (06 May 2022 04:35) (75 - 91)  BP: 154/98 (06 May 2022 04:35) (128/87 - 154/98)  BP(mean): --  ABP: --  ABP(mean): --  RR: 18 (06 May 2022 04:35) (18 - 29)  SpO2: 100% (06 May 2022 04:35) (97% - 100%)    I&O's Detail    On exam: awake, alert  Breathing comfortably on room air, no cough  Abd is soft, not tender and not distended  No rebound, no guarding                          8.3    6.04  )-----------( 374      ( 06 May 2022 07:31 )             26.9

## 2022-05-09 PROBLEM — R19.00 INTRA-ABDOMINAL AND PELVIC SWELLING, MASS AND LUMP, UNSPECIFIED SITE: Chronic | Status: ACTIVE | Noted: 2022-05-01

## 2022-05-09 LAB — SURGICAL PATHOLOGY STUDY: SIGNIFICANT CHANGE UP

## 2022-05-12 ENCOUNTER — APPOINTMENT (OUTPATIENT)
Dept: SURGICAL ONCOLOGY | Facility: CLINIC | Age: 42
End: 2022-05-12
Payer: MEDICAID

## 2022-05-12 VITALS
OXYGEN SATURATION: 97 % | TEMPERATURE: 97.8 F | RESPIRATION RATE: 15 BRPM | SYSTOLIC BLOOD PRESSURE: 138 MMHG | WEIGHT: 255 LBS | BODY MASS INDEX: 42.49 KG/M2 | HEIGHT: 65 IN | DIASTOLIC BLOOD PRESSURE: 86 MMHG | HEART RATE: 89 BPM

## 2022-05-12 DIAGNOSIS — K83.1 OBSTRUCTION OF BILE DUCT: ICD-10-CM

## 2022-05-12 DIAGNOSIS — K80.20 CALCULUS OF GALLBLADDER W/OUT CHOLECYSTITIS W/OUT OBSTRUCTION: ICD-10-CM

## 2022-05-12 DIAGNOSIS — R16.0 HEPATOMEGALY, NOT ELSEWHERE CLASSIFIED: ICD-10-CM

## 2022-05-12 DIAGNOSIS — R19.00 INTRA-ABDOMINAL AND PELVIC SWELLING, MASS AND LUMP, UNSPECIFIED SITE: ICD-10-CM

## 2022-05-12 PROCEDURE — 99205 OFFICE O/P NEW HI 60 MIN: CPT

## 2022-05-16 DIAGNOSIS — E66.01 MORBID (SEVERE) OBESITY DUE TO EXCESS CALORIES: ICD-10-CM

## 2022-05-16 DIAGNOSIS — K80.71 CALCULUS OF GALLBLADDER AND BILE DUCT WITHOUT CHOLECYSTITIS WITH OBSTRUCTION: ICD-10-CM

## 2022-05-16 DIAGNOSIS — E11.9 TYPE 2 DIABETES MELLITUS WITHOUT COMPLICATIONS: ICD-10-CM

## 2022-05-16 DIAGNOSIS — K44.9 DIAPHRAGMATIC HERNIA WITHOUT OBSTRUCTION OR GANGRENE: ICD-10-CM

## 2022-05-16 DIAGNOSIS — K29.00 ACUTE GASTRITIS WITHOUT BLEEDING: ICD-10-CM

## 2022-05-16 DIAGNOSIS — E87.6 HYPOKALEMIA: ICD-10-CM

## 2022-05-16 DIAGNOSIS — R00.0 TACHYCARDIA, UNSPECIFIED: ICD-10-CM

## 2022-05-16 DIAGNOSIS — I10 ESSENTIAL (PRIMARY) HYPERTENSION: ICD-10-CM

## 2022-05-16 DIAGNOSIS — D64.9 ANEMIA, UNSPECIFIED: ICD-10-CM

## 2022-05-16 DIAGNOSIS — K83.8 OTHER SPECIFIED DISEASES OF BILIARY TRACT: ICD-10-CM

## 2022-05-16 DIAGNOSIS — K52.9 NONINFECTIVE GASTROENTERITIS AND COLITIS, UNSPECIFIED: ICD-10-CM

## 2022-05-16 DIAGNOSIS — D72.829 ELEVATED WHITE BLOOD CELL COUNT, UNSPECIFIED: ICD-10-CM

## 2022-05-16 PROBLEM — K83.1 BILE DUCT STRICTURE: Status: ACTIVE | Noted: 2022-05-16

## 2022-05-16 PROBLEM — R19.00 RETROPERITONEAL MASS: Status: ACTIVE | Noted: 2022-05-16

## 2022-05-16 PROBLEM — K80.20 CHOLELITHIASIS: Status: ACTIVE | Noted: 2022-05-16

## 2022-05-16 PROBLEM — R16.0 LIVER MASS, LEFT LOBE: Status: ACTIVE | Noted: 2022-05-16

## 2022-05-16 RX ORDER — ATENOLOL AND CHLORTHALIDONE 50; 25 MG/1; MG/1
50-25 TABLET ORAL
Qty: 30 | Refills: 0 | Status: ACTIVE | COMMUNITY
Start: 2022-04-30

## 2022-05-16 RX ORDER — NIFEDIPINE 30 MG/1
30 TABLET, FILM COATED, EXTENDED RELEASE ORAL
Qty: 90 | Refills: 0 | Status: ACTIVE | COMMUNITY
Start: 2022-01-10

## 2022-05-16 RX ORDER — AMOXICILLIN AND CLAVULANATE POTASSIUM 875; 125 MG/1; MG/1
875-125 TABLET, COATED ORAL
Qty: 8 | Refills: 0 | Status: ACTIVE | COMMUNITY
Start: 2022-05-06

## 2022-05-16 RX ORDER — LISINOPRIL 20 MG/1
20 TABLET ORAL
Qty: 90 | Refills: 0 | Status: ACTIVE | COMMUNITY
Start: 2022-01-10

## 2022-05-16 RX ORDER — PANTOPRAZOLE 40 MG/1
40 TABLET, DELAYED RELEASE ORAL
Qty: 30 | Refills: 0 | Status: ACTIVE | COMMUNITY
Start: 2022-05-06

## 2022-05-16 RX ORDER — SEMAGLUTIDE 1.34 MG/ML
2 INJECTION, SOLUTION SUBCUTANEOUS
Qty: 2 | Refills: 0 | Status: ACTIVE | COMMUNITY
Start: 2021-11-01

## 2022-05-16 RX ORDER — POTASSIUM CHLORIDE 750 MG/1
10 TABLET, FILM COATED, EXTENDED RELEASE ORAL
Qty: 30 | Refills: 0 | Status: ACTIVE | COMMUNITY
Start: 2022-05-09

## 2022-05-16 RX ORDER — SEMAGLUTIDE 1.34 MG/ML
4 INJECTION, SOLUTION SUBCUTANEOUS
Qty: 3 | Refills: 0 | Status: ACTIVE | COMMUNITY
Start: 2022-03-01

## 2022-05-16 NOTE — CONSULT LETTER
[Dear  ___] : Dear  [unfilled], [Consult Letter:] : I had the pleasure of evaluating your patient, [unfilled]. [Please see my note below.] : Please see my note below. [Consult Closing:] : Thank you very much for allowing me to participate in the care of this patient.  If you have any questions, please do not hesitate to contact me. [Sincerely,] : Sincerely, [DrLacy  ___] : Dr. AVITIA [FreeTextEntry2] : Dr. Curtis Stahl [FreeTextEntry3] : Issa Barillas\par (M) [phone]\par (O) [phone]

## 2022-05-16 NOTE — REASON FOR VISIT
[Initial Consultation] : an initial consultation for [Referred By: ___] : Referred By: [unfilled] [Other: _____] : [unfilled]

## 2022-05-16 NOTE — ASSESSMENT
[FreeTextEntry1] : 1. Right retroperitoneal mass: suspect large lipoma, but an atypical lipoma or well differentiated liposarcoma cannot be excluded or determined based on imaging or core needle biopsy.  Recommend surgical resection.\par \par 2. Left liver lesion: unclear etiology as MRI abdomen was performed without intravenous contrast.  MRI abdomen with/without contrast ordered for better characterization.  Need for resection will be based on repeat MRI.\par \par 3. Cholelithiasis with biliary stricture: biliary stricture likely due to inflammation from cholecystitis.  As hepatic panel has normalized, will re-evaluate with repeat MRI.  Recommend cholecystectomy.  Need for ERCP/EUS on hold pending MRI.\par \par I have personally reviewed CT and MRI images with patient.  Diagnostic/therapeutic algorithm explained in detail.  She is a candidate for minimally invasive robotic approach to resection of retroperitoneal mass/cholecystectomy/possible liver resection.\par \par All questions answered in detail.  She wishes to proceed.

## 2022-05-16 NOTE — HISTORY OF PRESENT ILLNESS
[de-identified] : Ms. Mora is a 41 y/o female recently admitted to [facility] for right sided abdominal pain and elevated hepatic panel.\par CT and MR abdomen/pelvis demonstrated large gallstones, mild biliary ductal dilatation due to stricture, liver S2/3 mass and large 9 cm right retroperitoneal lipomatous lesion.\par Patient's symptoms improved with hydration/antibiotics and hepatic panel normalized.\par She is referred for surgical evaluation.\par Since her discharge, patient reports feeling well and is without recurrent symptoms or fever.

## 2022-05-20 ENCOUNTER — OUTPATIENT (OUTPATIENT)
Dept: OUTPATIENT SERVICES | Facility: HOSPITAL | Age: 42
LOS: 1 days | End: 2022-05-20

## 2022-05-20 VITALS
HEIGHT: 65 IN | SYSTOLIC BLOOD PRESSURE: 140 MMHG | OXYGEN SATURATION: 99 % | DIASTOLIC BLOOD PRESSURE: 80 MMHG | WEIGHT: 250 LBS | TEMPERATURE: 99 F | RESPIRATION RATE: 16 BRPM | HEART RATE: 74 BPM

## 2022-05-20 DIAGNOSIS — R94.31 ABNORMAL ELECTROCARDIOGRAM [ECG] [EKG]: ICD-10-CM

## 2022-05-20 DIAGNOSIS — K80.20 CALCULUS OF GALLBLADDER WITHOUT CHOLECYSTITIS WITHOUT OBSTRUCTION: ICD-10-CM

## 2022-05-20 DIAGNOSIS — E11.9 TYPE 2 DIABETES MELLITUS WITHOUT COMPLICATIONS: ICD-10-CM

## 2022-05-20 DIAGNOSIS — Z98.891 HISTORY OF UTERINE SCAR FROM PREVIOUS SURGERY: Chronic | ICD-10-CM

## 2022-05-20 LAB
A1C WITH ESTIMATED AVERAGE GLUCOSE RESULT: 5.8 % — HIGH (ref 4–5.6)
ALBUMIN SERPL ELPH-MCNC: 4.4 G/DL — SIGNIFICANT CHANGE UP (ref 3.3–5)
ALP SERPL-CCNC: 85 U/L — SIGNIFICANT CHANGE UP (ref 40–120)
ALT FLD-CCNC: 13 U/L — SIGNIFICANT CHANGE UP (ref 4–33)
ANION GAP SERPL CALC-SCNC: 15 MMOL/L — HIGH (ref 7–14)
APTT BLD: 34.3 SEC — SIGNIFICANT CHANGE UP (ref 27–36.3)
AST SERPL-CCNC: 14 U/L — SIGNIFICANT CHANGE UP (ref 4–32)
BILIRUB SERPL-MCNC: 0.7 MG/DL — SIGNIFICANT CHANGE UP (ref 0.2–1.2)
BLD GP AB SCN SERPL QL: NEGATIVE — SIGNIFICANT CHANGE UP
BUN SERPL-MCNC: 14 MG/DL — SIGNIFICANT CHANGE UP (ref 7–23)
CALCIUM SERPL-MCNC: 9.4 MG/DL — SIGNIFICANT CHANGE UP (ref 8.4–10.5)
CHLORIDE SERPL-SCNC: 100 MMOL/L — SIGNIFICANT CHANGE UP (ref 98–107)
CO2 SERPL-SCNC: 24 MMOL/L — SIGNIFICANT CHANGE UP (ref 22–31)
CREAT SERPL-MCNC: 0.85 MG/DL — SIGNIFICANT CHANGE UP (ref 0.5–1.3)
EGFR: 87 ML/MIN/1.73M2 — SIGNIFICANT CHANGE UP
ESTIMATED AVERAGE GLUCOSE: 120 — SIGNIFICANT CHANGE UP
GLUCOSE SERPL-MCNC: 71 MG/DL — SIGNIFICANT CHANGE UP (ref 70–99)
HCG UR QL: NEGATIVE — SIGNIFICANT CHANGE UP
HCT VFR BLD CALC: 33.5 % — LOW (ref 34.5–45)
HGB BLD-MCNC: 10.1 G/DL — LOW (ref 11.5–15.5)
INR BLD: 1.09 RATIO — SIGNIFICANT CHANGE UP (ref 0.88–1.16)
MCHC RBC-ENTMCNC: 24.5 PG — LOW (ref 27–34)
MCHC RBC-ENTMCNC: 30.1 GM/DL — LOW (ref 32–36)
MCV RBC AUTO: 81.3 FL — SIGNIFICANT CHANGE UP (ref 80–100)
NRBC # BLD: 0 /100 WBCS — SIGNIFICANT CHANGE UP
NRBC # FLD: 0 K/UL — SIGNIFICANT CHANGE UP
PLATELET # BLD AUTO: 388 K/UL — SIGNIFICANT CHANGE UP (ref 150–400)
POTASSIUM SERPL-MCNC: 3.1 MMOL/L — LOW (ref 3.5–5.3)
POTASSIUM SERPL-SCNC: 3.1 MMOL/L — LOW (ref 3.5–5.3)
PROT SERPL-MCNC: 7.8 G/DL — SIGNIFICANT CHANGE UP (ref 6–8.3)
PROTHROM AB SERPL-ACNC: 12.6 SEC — SIGNIFICANT CHANGE UP (ref 10.5–13.4)
RBC # BLD: 4.12 M/UL — SIGNIFICANT CHANGE UP (ref 3.8–5.2)
RBC # FLD: 17.5 % — HIGH (ref 10.3–14.5)
RH IG SCN BLD-IMP: POSITIVE — SIGNIFICANT CHANGE UP
SODIUM SERPL-SCNC: 139 MMOL/L — SIGNIFICANT CHANGE UP (ref 135–145)
WBC # BLD: 6.24 K/UL — SIGNIFICANT CHANGE UP (ref 3.8–10.5)
WBC # FLD AUTO: 6.24 K/UL — SIGNIFICANT CHANGE UP (ref 3.8–10.5)

## 2022-05-20 RX ORDER — NIFEDIPINE 30 MG
1 TABLET, EXTENDED RELEASE 24 HR ORAL
Qty: 0 | Refills: 0 | DISCHARGE

## 2022-05-20 RX ORDER — FERROUS SULFATE 325(65) MG
1 TABLET ORAL
Qty: 0 | Refills: 0 | DISCHARGE

## 2022-05-20 RX ORDER — SEMAGLUTIDE 0.68 MG/ML
0 INJECTION, SOLUTION SUBCUTANEOUS
Qty: 0 | Refills: 0 | DISCHARGE

## 2022-05-20 RX ORDER — POTASSIUM CHLORIDE 20 MEQ
1 PACKET (EA) ORAL
Qty: 0 | Refills: 0 | DISCHARGE

## 2022-05-20 RX ORDER — METFORMIN HYDROCHLORIDE 850 MG/1
1 TABLET ORAL
Qty: 0 | Refills: 0 | DISCHARGE

## 2022-05-20 RX ORDER — ATENOLOL AND CHLORTHALIDONE 50; 25 MG/1; MG/1
1 TABLET ORAL
Qty: 0 | Refills: 0 | DISCHARGE

## 2022-05-20 NOTE — H&P PST ADULT - NSANTHOSAYNRD_GEN_A_CORE
No. ARMAND screening performed.  STOP BANG Legend: 0-2 = LOW Risk; 3-4 = INTERMEDIATE Risk; 5-8 = HIGH Risk

## 2022-05-20 NOTE — H&P PST ADULT - GASTROINTESTINAL COMMENTS
42 years old female recently admitted to [facility] for righ tside abdominal pain and elevated hepatic paenl. Imaging was done an dshowed large gallstones, biliary ductal dilation due to stricture, liver mass and large right retroperitoneal  lipomatous lesion.  Pt reports pain subsided right retroperitoneal mass, liver lesion, cholelithiasis per dx recently admitted to [facility] for right side abdominal pain and elevated hepatic panel  Imaging was done an showed large gallstones, biliary ductal dilation due to stricture, liver mass and large right retroperitoneal  lipomatous lesion.  Scheduled for Robotic resection of retroperitoneal mass, cholecystectomy, possible liver resection, possible open

## 2022-05-20 NOTE — H&P PST ADULT - HISTORY OF PRESENT ILLNESS
42 years old female recently admitted to [facility] for righ tside abdominal pain and elevated hepatic paenl. Imaging was done an dshowed large gallstones, biliary ductal dilation due to stricture, liver mass and large right retroperitoneal  lipomatous lesion.  Scheduled for Robotic resection of retroperitoneal mass, cholecystectomy, possible liver resection, possible open 42 years old female recently admitted to [facility] for right side abdominal pain and elevated hepatic panel  Imaging was done an showed large gallstones, biliary ductal dilation due to stricture, liver mass and large right retroperitoneal  lipomatous lesion.  Scheduled for Robotic resection of retroperitoneal mass, cholecystectomy, possible liver resection, possible open

## 2022-05-20 NOTE — H&P PST ADULT - ASSESSMENT
42 years old female recently admitted to [facility] for right side abdominal pain and elevated hepatic panel  Imaging was done an showed large gallstones, biliary ductal dilation due to stricture, liver mass and large right retroperitoneal  lipomatous lesion.  Scheduled for Robotic resection of retroperitoneal mass, cholecystectomy, possible liver resection, possible open

## 2022-05-20 NOTE — H&P PST ADULT - NSICDXPASTMEDICALHX_GEN_ALL_CORE_FT
PAST MEDICAL HISTORY:  Abdominal mass     Diabetes mellitus, type 2     Essential hypertension     Obesity      PAST MEDICAL HISTORY:  Abdominal mass     Diabetes mellitus, type 2     Essential hypertension     Gall bladder stones     Gall stones, common bile duct     Obesity

## 2022-05-20 NOTE — H&P PST ADULT - PROBLEM SELECTOR PLAN 1
Cholelithiasis, liver lesion, retroperitoneal mass   Robotic resection of retroperitoneal mass, cholecystectomy, possible liver resection, possible open    CBC CMP PT/PTT HgbA1C T&S     Preop instructions and antibacterial soap given and explained (verbal and written), with teach back.

## 2022-05-22 PROBLEM — K80.20 CALCULUS OF GALLBLADDER WITHOUT CHOLECYSTITIS WITHOUT OBSTRUCTION: Chronic | Status: ACTIVE | Noted: 2022-05-20

## 2022-05-22 PROBLEM — K80.50 CALCULUS OF BILE DUCT WITHOUT CHOLANGITIS OR CHOLECYSTITIS WITHOUT OBSTRUCTION: Chronic | Status: ACTIVE | Noted: 2022-05-20

## 2022-05-22 PROBLEM — E66.9 OBESITY, UNSPECIFIED: Chronic | Status: ACTIVE | Noted: 2022-05-20

## 2022-05-23 ENCOUNTER — RESULT REVIEW (OUTPATIENT)
Age: 42
End: 2022-05-23

## 2022-05-23 ENCOUNTER — OUTPATIENT (OUTPATIENT)
Dept: OUTPATIENT SERVICES | Facility: HOSPITAL | Age: 42
LOS: 1 days | End: 2022-05-23
Payer: MEDICAID

## 2022-05-23 ENCOUNTER — APPOINTMENT (OUTPATIENT)
Dept: MRI IMAGING | Facility: IMAGING CENTER | Age: 42
End: 2022-05-23
Payer: MEDICAID

## 2022-05-23 DIAGNOSIS — Z98.891 HISTORY OF UTERINE SCAR FROM PREVIOUS SURGERY: Chronic | ICD-10-CM

## 2022-05-23 DIAGNOSIS — R16.0 HEPATOMEGALY, NOT ELSEWHERE CLASSIFIED: ICD-10-CM

## 2022-05-23 PROCEDURE — 74183 MRI ABD W/O CNTR FLWD CNTR: CPT | Mod: 26

## 2022-05-23 PROCEDURE — 74183 MRI ABD W/O CNTR FLWD CNTR: CPT

## 2022-05-27 ENCOUNTER — APPOINTMENT (OUTPATIENT)
Dept: SURGICAL ONCOLOGY | Facility: HOSPITAL | Age: 42
End: 2022-05-27

## 2022-06-15 ENCOUNTER — APPOINTMENT (OUTPATIENT)
Dept: SURGICAL ONCOLOGY | Facility: HOSPITAL | Age: 42
End: 2022-06-15

## 2022-07-25 NOTE — PATIENT PROFILE ADULT - SURGICAL SITE INCISION
Glycopyrrolate Pregnancy And Lactation Text: This medication is Pregnancy Category B and is considered safe during pregnancy. It is unknown if it is excreted breast milk. no

## 2023-02-17 NOTE — ED ADULT NURSE NOTE - NSIMPLEMENTINTERV_GEN_ALL_ED
36.6 Implemented All Universal Safety Interventions:  [place] to call system. Call bell, personal items and telephone within reach. Instruct patient to call for assistance. Room bathroom lighting operational. Non-slip footwear when patient is off stretcher. Physically safe environment: no spills, clutter or unnecessary equipment. Stretcher in lowest position, wheels locked, appropriate side rails in place.

## 2024-02-10 NOTE — ED PROVIDER NOTE - NSCAREINITIATED _GEN_ER
Morgan Salvador(Attending)
Pt c/o right leg pain x5 days, perineal discomfort and constipation for x4 days. it is difficult to comprehend what the patient is explaining, but pt seems to have an understanding of his medical problems. Past medical history of right kidney transplant, HTN, and DM type 2, insulin dependent. Pt reports he also needs to follow up with endocrinologist. states the referring doctor wanted him to go to a different hospital, but patient prefers to come here.

## 2024-04-09 NOTE — ED ADULT NURSE NOTE - OBJECTIVE STATEMENT
Upon review of pt's  can see results were already addressed per 4/1/24 telephone encounter. Writer will advise Dr. Stokes if CTA is still to be ordered.     ----- Message from Glo Stokes MD sent at 4/5/2024  8:04 AM CDT -----  Please notify patient's abnormal result.  Please order a CT Angio Abd Aorta Iliofem w Leg Runoff   40 year old female c/o SOB. Pt A+Ox3, reports known COVID positive x7 days and SOB beginning x2 days ago. Pt reports family members at home are also COVID positive, and she developed a fever today, tmax 101. Upon assessment, pt presents well appearing. Pt denies headache, dizziness, chest pain, N/V, abdominal pain, urinary or bowel symptoms, fevers or chills.

## 2025-01-06 NOTE — ED ADULT NURSE NOTE - ED STAT RN HANDOFF WHERE
Problem: Chronic Conditions and Co-morbidities  Goal: Patient's chronic conditions and co-morbidity symptoms are monitored and maintained or improved  1/6/2025 0547 by Tevin Mason RN  Outcome: Progressing  Flowsheets (Taken 1/5/2025 0906 by Liliana Crockett RN)  Care Plan - Patient's Chronic Conditions and Co-Morbidity Symptoms are Monitored and Maintained or Improved:   Collaborate with multidisciplinary team to address chronic and comorbid conditions and prevent exacerbation or deterioration   Monitor and assess patient's chronic conditions and comorbid symptoms for stability, deterioration, or improvement  1/5/2025 1648 by Liliana Crockett RN  Outcome: Progressing  Flowsheets (Taken 1/5/2025 0906)  Care Plan - Patient's Chronic Conditions and Co-Morbidity Symptoms are Monitored and Maintained or Improved:   Collaborate with multidisciplinary team to address chronic and comorbid conditions and prevent exacerbation or deterioration   Monitor and assess patient's chronic conditions and comorbid symptoms for stability, deterioration, or improvement     Problem: Pain  Goal: Verbalizes/displays adequate comfort level or baseline comfort level  1/6/2025 0547 by Tevin Mason RN  Outcome: Progressing  Flowsheets (Taken 1/5/2025 0104)  Verbalizes/displays adequate comfort level or baseline comfort level:   Encourage patient to monitor pain and request assistance   Assess pain using appropriate pain scale   Administer analgesics based on type and severity of pain and evaluate response   Implement non-pharmacological measures as appropriate and evaluate response   Notify Licensed Independent Practitioner if interventions unsuccessful or patient reports new pain   Consider cultural and social influences on pain and pain management  1/5/2025 1648 by Liliana Crockett RN  Outcome: Progressing  Flowsheets (Taken 1/5/2025 0104 by Tevin Mason RN)  Verbalizes/displays adequate comfort level or baseline comfort level:    2D 260K

## 2025-01-27 NOTE — H&P ADULT - NSRESEARCHGRANTASSESS_GEN_A_CORE
Thank you for allowing us to care for you today.  You may receive an electronic survey after this visit.  We appreciate your feedback so we can continually improve our services.         ~Orthopedic department    <<--- Click to launch IMPROVE-DD VTE Assessment

## 2025-06-28 NOTE — ED PROVIDER NOTE - IV ALTEPASE ADMIN HIDDEN
show Alcohol use disorder, admission last month for withdrawal and UGIB, fall last week at [facility], now presenting to [facility] with hematuria and withdrawal symptoms. History inconsistent between ER and arrival to [facility] - patient reports no drinks consumed in last week and CIWA on arrival 0. Transaminitis pattern however consistent with alcohol use.     Continue with CIWA protocol, PRN benzo for now - if CIWA elevated will pheno load as per last admission, Urology to see re hematuria - CT with bladder dome wall mass vs diverticula, will likely need cystoscopy this admission. Has bruise on R flank from fall, but no evidence of sig trauma on CT. Rest as per above.